# Patient Record
Sex: FEMALE | Race: WHITE | NOT HISPANIC OR LATINO
[De-identification: names, ages, dates, MRNs, and addresses within clinical notes are randomized per-mention and may not be internally consistent; named-entity substitution may affect disease eponyms.]

---

## 2018-02-23 ENCOUNTER — TRANSCRIPTION ENCOUNTER (OUTPATIENT)
Age: 63
End: 2018-02-23

## 2018-12-27 ENCOUNTER — APPOINTMENT (OUTPATIENT)
Dept: RADIOLOGY | Facility: CLINIC | Age: 63
End: 2018-12-27
Payer: COMMERCIAL

## 2018-12-27 ENCOUNTER — OUTPATIENT (OUTPATIENT)
Dept: OUTPATIENT SERVICES | Facility: HOSPITAL | Age: 63
LOS: 1 days | End: 2018-12-27
Payer: COMMERCIAL

## 2018-12-27 DIAGNOSIS — Z98.89 OTHER SPECIFIED POSTPROCEDURAL STATES: Chronic | ICD-10-CM

## 2018-12-27 DIAGNOSIS — I83.93 ASYMPTOMATIC VARICOSE VEINS OF BILATERAL LOWER EXTREMITIES: Chronic | ICD-10-CM

## 2018-12-27 DIAGNOSIS — Z00.8 ENCOUNTER FOR OTHER GENERAL EXAMINATION: ICD-10-CM

## 2018-12-27 PROCEDURE — 73110 X-RAY EXAM OF WRIST: CPT | Mod: 26,50

## 2018-12-27 PROCEDURE — 73130 X-RAY EXAM OF HAND: CPT

## 2018-12-27 PROCEDURE — 73130 X-RAY EXAM OF HAND: CPT | Mod: 26,50

## 2018-12-27 PROCEDURE — 73110 X-RAY EXAM OF WRIST: CPT

## 2019-01-25 ENCOUNTER — TRANSCRIPTION ENCOUNTER (OUTPATIENT)
Age: 64
End: 2019-01-25

## 2019-02-15 ENCOUNTER — TRANSCRIPTION ENCOUNTER (OUTPATIENT)
Age: 64
End: 2019-02-15

## 2020-01-23 ENCOUNTER — TRANSCRIPTION ENCOUNTER (OUTPATIENT)
Age: 65
End: 2020-01-23

## 2021-11-21 ENCOUNTER — TRANSCRIPTION ENCOUNTER (OUTPATIENT)
Age: 66
End: 2021-11-21

## 2021-12-06 ENCOUNTER — TRANSCRIPTION ENCOUNTER (OUTPATIENT)
Age: 66
End: 2021-12-06

## 2021-12-10 ENCOUNTER — TRANSCRIPTION ENCOUNTER (OUTPATIENT)
Age: 66
End: 2021-12-10

## 2022-09-20 ENCOUNTER — NON-APPOINTMENT (OUTPATIENT)
Age: 67
End: 2022-09-20

## 2023-04-26 ENCOUNTER — NON-APPOINTMENT (OUTPATIENT)
Age: 68
End: 2023-04-26

## 2023-07-05 ENCOUNTER — APPOINTMENT (OUTPATIENT)
Dept: RADIOLOGY | Facility: CLINIC | Age: 68
End: 2023-07-05
Payer: COMMERCIAL

## 2023-07-05 PROCEDURE — 77080 DXA BONE DENSITY AXIAL: CPT

## 2023-07-06 ENCOUNTER — TRANSCRIPTION ENCOUNTER (OUTPATIENT)
Age: 68
End: 2023-07-06

## 2023-07-27 ENCOUNTER — OUTPATIENT (OUTPATIENT)
Dept: OUTPATIENT SERVICES | Facility: HOSPITAL | Age: 68
LOS: 1 days | End: 2023-07-27
Payer: COMMERCIAL

## 2023-07-27 VITALS
SYSTOLIC BLOOD PRESSURE: 170 MMHG | HEIGHT: 63 IN | DIASTOLIC BLOOD PRESSURE: 83 MMHG | HEART RATE: 76 BPM | TEMPERATURE: 98 F | OXYGEN SATURATION: 98 % | RESPIRATION RATE: 18 BRPM | WEIGHT: 130.95 LBS

## 2023-07-27 DIAGNOSIS — M21.611 BUNION OF RIGHT FOOT: ICD-10-CM

## 2023-07-27 DIAGNOSIS — I83.93 ASYMPTOMATIC VARICOSE VEINS OF BILATERAL LOWER EXTREMITIES: Chronic | ICD-10-CM

## 2023-07-27 DIAGNOSIS — Z98.890 OTHER SPECIFIED POSTPROCEDURAL STATES: Chronic | ICD-10-CM

## 2023-07-27 DIAGNOSIS — Z01.818 ENCOUNTER FOR OTHER PREPROCEDURAL EXAMINATION: ICD-10-CM

## 2023-07-27 DIAGNOSIS — Z98.89 OTHER SPECIFIED POSTPROCEDURAL STATES: Chronic | ICD-10-CM

## 2023-07-27 PROCEDURE — 85027 COMPLETE CBC AUTOMATED: CPT

## 2023-07-27 PROCEDURE — G0463: CPT

## 2023-07-27 PROCEDURE — 80048 BASIC METABOLIC PNL TOTAL CA: CPT

## 2023-07-27 RX ORDER — SODIUM CHLORIDE 9 MG/ML
1000 INJECTION, SOLUTION INTRAVENOUS
Refills: 0 | Status: DISCONTINUED | OUTPATIENT
Start: 2023-08-16 | End: 2023-08-30

## 2023-07-27 NOTE — H&P PST ADULT - ASSESSMENT
DASI score: 7.0  DASI activity: active at work stairs daily  Loose teeth or denture: Slight loose tooth right lower molar

## 2023-07-27 NOTE — H&P PST ADULT - NSICDXPASTSURGICALHX_GEN_ALL_CORE_FT
PAST SURGICAL HISTORY:  S/P foot surgery, right     Status post bunionectomy left    Varicose vein of leg removal of varicose vein LLE 2012

## 2023-07-27 NOTE — H&P PST ADULT - HISTORY OF PRESENT ILLNESS
68yr old female with right foot bunion and hammertoe. Pt states when she wears shoes the bunion is very painful and the hammertoe rubs inside the shoe. Now coming for right foot lapiplasty. Pt had elevated systolic pressure. Will get med eval. Hx of hypothyroid HLD. Covid hx 12/2022 mild symptoms. 68yr old female with right foot bunion and hammertoe. Pt states when she wears shoes the bunion is very painful and the hammertoe rubs inside the shoe. Now coming for right foot lapifuse. Pt had elevated systolic pressure. Will get med eval. Hx of hypothyroid HLD. Covid hx 12/2022 mild symptoms.

## 2023-07-27 NOTE — H&P PST ADULT - ATTENDING PHYSICIAN: I HAVE REVIEWED THE CLINICAL DOCUMENTATION AND AGREE WITH THE ABOVE NOTE
Left hip pain that has been \"since November after I fell on the stairs.\"  Ambulatory at triage.   Statement Selected

## 2023-07-27 NOTE — H&P PST ADULT - NSICDXPASTMEDICALHX_GEN_ALL_CORE_FT
PAST MEDICAL HISTORY:  Dyslipidemia     History of osteoarthritis     Hypertension     Hypothyroidism     Osteopenia     Psoriasis     Stress fracture Left foot

## 2023-08-15 ENCOUNTER — TRANSCRIPTION ENCOUNTER (OUTPATIENT)
Age: 68
End: 2023-08-15

## 2023-08-16 ENCOUNTER — OUTPATIENT (OUTPATIENT)
Dept: OUTPATIENT SERVICES | Facility: HOSPITAL | Age: 68
LOS: 1 days | End: 2023-08-16
Payer: COMMERCIAL

## 2023-08-16 ENCOUNTER — RESULT REVIEW (OUTPATIENT)
Age: 68
End: 2023-08-16

## 2023-08-16 VITALS
HEART RATE: 65 BPM | SYSTOLIC BLOOD PRESSURE: 152 MMHG | RESPIRATION RATE: 20 BRPM | OXYGEN SATURATION: 96 % | DIASTOLIC BLOOD PRESSURE: 67 MMHG

## 2023-08-16 VITALS
HEART RATE: 62 BPM | WEIGHT: 130.95 LBS | HEIGHT: 63 IN | RESPIRATION RATE: 18 BRPM | OXYGEN SATURATION: 97 % | SYSTOLIC BLOOD PRESSURE: 170 MMHG | TEMPERATURE: 97 F | DIASTOLIC BLOOD PRESSURE: 71 MMHG

## 2023-08-16 DIAGNOSIS — I83.93 ASYMPTOMATIC VARICOSE VEINS OF BILATERAL LOWER EXTREMITIES: Chronic | ICD-10-CM

## 2023-08-16 DIAGNOSIS — Z98.89 OTHER SPECIFIED POSTPROCEDURAL STATES: Chronic | ICD-10-CM

## 2023-08-16 DIAGNOSIS — Z98.890 OTHER SPECIFIED POSTPROCEDURAL STATES: Chronic | ICD-10-CM

## 2023-08-16 DIAGNOSIS — M21.611 BUNION OF RIGHT FOOT: ICD-10-CM

## 2023-08-16 PROCEDURE — 73630 X-RAY EXAM OF FOOT: CPT | Mod: 26,RT

## 2023-08-16 DEVICE — SCREW CANN 4.0X30MM: Type: IMPLANTABLE DEVICE | Site: RIGHT | Status: FUNCTIONAL

## 2023-08-16 DEVICE — IMPLANTABLE DEVICE: Type: IMPLANTABLE DEVICE | Site: RIGHT | Status: FUNCTIONAL

## 2023-08-16 DEVICE — SCREW LOKG 3.5X12MM: Type: IMPLANTABLE DEVICE | Site: RIGHT | Status: FUNCTIONAL

## 2023-08-16 DEVICE — SCREW CORT LO PROF 3.5X22MM: Type: IMPLANTABLE DEVICE | Site: RIGHT | Status: FUNCTIONAL

## 2023-08-16 DEVICE — PIN STEINMAN SMTH 2.5X100MM: Type: IMPLANTABLE DEVICE | Site: RIGHT | Status: FUNCTIONAL

## 2023-08-16 DEVICE — SCREW CORT LO PROF 3.5X14MM: Type: IMPLANTABLE DEVICE | Site: RIGHT | Status: FUNCTIONAL

## 2023-08-16 DEVICE — PIN FIXATION TEMP 1.1MM SM: Type: IMPLANTABLE DEVICE | Site: RIGHT | Status: FUNCTIONAL

## 2023-08-16 DEVICE — GWIRE 1.4X150MM: Type: IMPLANTABLE DEVICE | Site: RIGHT | Status: FUNCTIONAL

## 2023-08-16 DEVICE — GRAFT BONE AUGMENT INJ 3ML SYNTHETIC: Type: IMPLANTABLE DEVICE | Site: RIGHT | Status: FUNCTIONAL

## 2023-08-16 RX ORDER — CEFAZOLIN SODIUM 1 G
2000 VIAL (EA) INJECTION ONCE
Refills: 0 | Status: COMPLETED | OUTPATIENT
Start: 2023-08-16 | End: 2023-08-16

## 2023-08-16 RX ORDER — CHOLECALCIFEROL (VITAMIN D3) 125 MCG
10 CAPSULE ORAL
Refills: 0 | DISCHARGE

## 2023-08-16 RX ORDER — CHLORHEXIDINE GLUCONATE 213 G/1000ML
1 SOLUTION TOPICAL ONCE
Refills: 0 | Status: COMPLETED | OUTPATIENT
Start: 2023-08-16 | End: 2023-08-16

## 2023-08-16 RX ORDER — FENTANYL CITRATE 50 UG/ML
25 INJECTION INTRAVENOUS
Refills: 0 | Status: DISCONTINUED | OUTPATIENT
Start: 2023-08-16 | End: 2023-08-16

## 2023-08-16 RX ORDER — BACILLUS COAGULANS/INULIN 1B-250 MG
1 CAPSULE ORAL
Refills: 0 | DISCHARGE

## 2023-08-16 RX ORDER — OMEGA-3 ACID ETHYL ESTERS 1 G
1 CAPSULE ORAL
Refills: 0 | DISCHARGE

## 2023-08-16 RX ORDER — LIDOCAINE HCL 20 MG/ML
0.2 VIAL (ML) INJECTION ONCE
Refills: 0 | Status: COMPLETED | OUTPATIENT
Start: 2023-08-16 | End: 2023-08-16

## 2023-08-16 RX ORDER — ONDANSETRON 8 MG/1
4 TABLET, FILM COATED ORAL ONCE
Refills: 0 | Status: DISCONTINUED | OUTPATIENT
Start: 2023-08-16 | End: 2023-08-30

## 2023-08-16 RX ADMIN — SODIUM CHLORIDE 100 MILLILITER(S): 9 INJECTION, SOLUTION INTRAVENOUS at 06:16

## 2023-08-16 RX ADMIN — CHLORHEXIDINE GLUCONATE 1 APPLICATION(S): 213 SOLUTION TOPICAL at 06:16

## 2023-08-16 NOTE — ASU DISCHARGE PLAN (ADULT/PEDIATRIC) - ASU DC SPECIAL INSTRUCTIONSFT
You just had R foot surgery. Please keep your dressings clean dry and intact until follow up with Dr. Puente, please do not get your cast or dressings wet. Please do not bear weight on the R foot.

## 2023-08-16 NOTE — ASU PREOP CHECKLIST - LOOSE TEETH
right lower molar slightly loose anesthesia aware/yes right lower molar slightly loose Dr Teixeira aware/yes

## 2023-08-16 NOTE — ASU DISCHARGE PLAN (ADULT/PEDIATRIC) - NURSING INSTRUCTIONS
Next dose of tylenol at/after 04pm____. Do not exceed 4000mg in a 24hour  period. Every 6hours as needed.

## 2023-08-16 NOTE — ASU DISCHARGE PLAN (ADULT/PEDIATRIC) - CARE PROVIDER_API CALL
Deja Puente  Podiatric Medicine and Surgery  [address], [address]  [place] [address]  Phone: [phone]  Fax: [phone]  Established Patient  Follow Up Time: 1 week

## 2023-08-17 PROCEDURE — C1713: CPT

## 2023-08-17 PROCEDURE — C1769: CPT

## 2023-08-17 PROCEDURE — 88300 SURGICAL PATH GROSS: CPT | Mod: 26

## 2023-08-17 PROCEDURE — 28297 COR HLX VLGS JT ARTHRD: CPT | Mod: T5

## 2023-08-17 PROCEDURE — 88300 SURGICAL PATH GROSS: CPT

## 2023-08-17 PROCEDURE — 73630 X-RAY EXAM OF FOOT: CPT

## 2023-08-17 PROCEDURE — 28285 REPAIR OF HAMMERTOE: CPT | Mod: T6

## 2023-08-17 PROCEDURE — C1734: CPT

## 2023-08-31 LAB — SURGICAL PATHOLOGY STUDY: SIGNIFICANT CHANGE UP

## 2023-09-16 ENCOUNTER — NON-APPOINTMENT (OUTPATIENT)
Age: 68
End: 2023-09-16

## 2023-11-19 ENCOUNTER — NON-APPOINTMENT (OUTPATIENT)
Age: 68
End: 2023-11-19

## 2024-12-31 PROBLEM — Z87.39 PERSONAL HISTORY OF OTHER DISEASES OF THE MUSCULOSKELETAL SYSTEM AND CONNECTIVE TISSUE: Chronic | Status: ACTIVE | Noted: 2023-07-27

## 2025-01-07 ENCOUNTER — APPOINTMENT (OUTPATIENT)
Dept: ORTHOPEDIC SURGERY | Facility: CLINIC | Age: 70
End: 2025-01-07

## 2025-05-10 ENCOUNTER — INPATIENT (INPATIENT)
Facility: HOSPITAL | Age: 70
LOS: 0 days | Discharge: HOME | DRG: 596 | End: 2025-05-11
Attending: HOSPITALIST | Admitting: HOSPITALIST
Payer: MEDICARE

## 2025-05-10 VITALS
WEIGHT: 162.04 LBS | OXYGEN SATURATION: 100 % | TEMPERATURE: 98 F | HEART RATE: 98 BPM | DIASTOLIC BLOOD PRESSURE: 91 MMHG | RESPIRATION RATE: 18 BRPM | SYSTOLIC BLOOD PRESSURE: 173 MMHG | HEIGHT: 61 IN

## 2025-05-10 DIAGNOSIS — Z98.890 OTHER SPECIFIED POSTPROCEDURAL STATES: Chronic | ICD-10-CM

## 2025-05-10 DIAGNOSIS — Z98.89 OTHER SPECIFIED POSTPROCEDURAL STATES: Chronic | ICD-10-CM

## 2025-05-10 DIAGNOSIS — I83.93 ASYMPTOMATIC VARICOSE VEINS OF BILATERAL LOWER EXTREMITIES: Chronic | ICD-10-CM

## 2025-05-10 LAB
ALBUMIN SERPL ELPH-MCNC: 3.7 G/DL — SIGNIFICANT CHANGE UP (ref 3.3–5)
ALP SERPL-CCNC: 87 U/L — SIGNIFICANT CHANGE UP (ref 40–120)
ALT FLD-CCNC: 66 U/L — HIGH (ref 10–45)
ANION GAP SERPL CALC-SCNC: 16 MMOL/L — SIGNIFICANT CHANGE UP (ref 5–17)
AST SERPL-CCNC: 95 U/L — HIGH (ref 10–40)
BASOPHILS # BLD AUTO: 0.06 K/UL — SIGNIFICANT CHANGE UP (ref 0–0.2)
BASOPHILS NFR BLD AUTO: 0.8 % — SIGNIFICANT CHANGE UP (ref 0–2)
BILIRUB SERPL-MCNC: 0.7 MG/DL — SIGNIFICANT CHANGE UP (ref 0.2–1.2)
BUN SERPL-MCNC: 15 MG/DL — SIGNIFICANT CHANGE UP (ref 7–23)
CALCIUM SERPL-MCNC: 9 MG/DL — SIGNIFICANT CHANGE UP (ref 8.4–10.5)
CHLORIDE SERPL-SCNC: 100 MMOL/L — SIGNIFICANT CHANGE UP (ref 96–108)
CO2 SERPL-SCNC: 18 MMOL/L — LOW (ref 22–31)
CREAT SERPL-MCNC: 0.86 MG/DL — SIGNIFICANT CHANGE UP (ref 0.5–1.3)
EGFR: 72 ML/MIN/1.73M2 — SIGNIFICANT CHANGE UP
EGFR: 72 ML/MIN/1.73M2 — SIGNIFICANT CHANGE UP
EOSINOPHIL # BLD AUTO: 0.16 K/UL — SIGNIFICANT CHANGE UP (ref 0–0.5)
EOSINOPHIL NFR BLD AUTO: 2.2 % — SIGNIFICANT CHANGE UP (ref 0–6)
FLUAV AG NPH QL: SIGNIFICANT CHANGE UP
FLUBV AG NPH QL: SIGNIFICANT CHANGE UP
GLUCOSE SERPL-MCNC: 89 MG/DL — SIGNIFICANT CHANGE UP (ref 70–99)
HCT VFR BLD CALC: 42.6 % — SIGNIFICANT CHANGE UP (ref 34.5–45)
HGB BLD-MCNC: 14.2 G/DL — SIGNIFICANT CHANGE UP (ref 11.5–15.5)
IMM GRANULOCYTES NFR BLD AUTO: 0.3 % — SIGNIFICANT CHANGE UP (ref 0–0.9)
LACTATE BLDV-MCNC: 1.6 MMOL/L — SIGNIFICANT CHANGE UP (ref 0.5–2)
LIDOCAIN IGE QN: 221 U/L — HIGH (ref 7–60)
LYMPHOCYTES # BLD AUTO: 0.86 K/UL — LOW (ref 1–3.3)
LYMPHOCYTES # BLD AUTO: 11.6 % — LOW (ref 13–44)
MCHC RBC-ENTMCNC: 33.3 G/DL — SIGNIFICANT CHANGE UP (ref 32–36)
MCHC RBC-ENTMCNC: 34.9 PG — HIGH (ref 27–34)
MCV RBC AUTO: 104.7 FL — HIGH (ref 80–100)
MONOCYTES # BLD AUTO: 1 K/UL — HIGH (ref 0–0.9)
MONOCYTES NFR BLD AUTO: 13.5 % — SIGNIFICANT CHANGE UP (ref 2–14)
NEUTROPHILS # BLD AUTO: 5.31 K/UL — SIGNIFICANT CHANGE UP (ref 1.8–7.4)
NEUTROPHILS NFR BLD AUTO: 71.6 % — SIGNIFICANT CHANGE UP (ref 43–77)
NRBC BLD AUTO-RTO: 0 /100 WBCS — SIGNIFICANT CHANGE UP (ref 0–0)
PLATELET # BLD AUTO: 323 K/UL — SIGNIFICANT CHANGE UP (ref 150–400)
POTASSIUM SERPL-MCNC: 3.4 MMOL/L — LOW (ref 3.5–5.3)
POTASSIUM SERPL-SCNC: 3.4 MMOL/L — LOW (ref 3.5–5.3)
PROT SERPL-MCNC: 8.8 G/DL — HIGH (ref 6–8.3)
RBC # BLD: 4.07 M/UL — SIGNIFICANT CHANGE UP (ref 3.8–5.2)
RBC # FLD: 12.6 % — SIGNIFICANT CHANGE UP (ref 10.3–14.5)
RSV RNA NPH QL NAA+NON-PROBE: SIGNIFICANT CHANGE UP
SARS-COV-2 RNA SPEC QL NAA+PROBE: SIGNIFICANT CHANGE UP
SODIUM SERPL-SCNC: 134 MMOL/L — LOW (ref 135–145)
SOURCE RESPIRATORY: SIGNIFICANT CHANGE UP
WBC # BLD: 7.41 K/UL — SIGNIFICANT CHANGE UP (ref 3.8–10.5)
WBC # FLD AUTO: 7.41 K/UL — SIGNIFICANT CHANGE UP (ref 3.8–10.5)

## 2025-05-10 PROCEDURE — 71045 X-RAY EXAM CHEST 1 VIEW: CPT | Mod: 26

## 2025-05-10 PROCEDURE — 93010 ELECTROCARDIOGRAM REPORT: CPT

## 2025-05-10 PROCEDURE — 99285 EMERGENCY DEPT VISIT HI MDM: CPT | Mod: GC

## 2025-05-10 RX ORDER — ONDANSETRON HCL/PF 4 MG/2 ML
4 VIAL (ML) INJECTION ONCE
Refills: 0 | Status: COMPLETED | OUTPATIENT
Start: 2025-05-10 | End: 2025-05-10

## 2025-05-10 RX ORDER — DIPHENHYDRAMINE HCL 12.5MG/5ML
25 ELIXIR ORAL ONCE
Refills: 0 | Status: COMPLETED | OUTPATIENT
Start: 2025-05-10 | End: 2025-05-10

## 2025-05-10 RX ADMIN — Medication 20 MILLIGRAM(S): at 22:04

## 2025-05-10 RX ADMIN — Medication 25 MILLIGRAM(S): at 22:05

## 2025-05-10 RX ADMIN — Medication 1000 MILLILITER(S): at 22:06

## 2025-05-10 RX ADMIN — Medication 4 MILLIGRAM(S): at 22:05

## 2025-05-10 NOTE — ED PROVIDER NOTE - NSICDXPASTMEDICALHX_GEN_ALL_CORE_FT
PAST MEDICAL HISTORY:  Dyslipidemia     History of osteoarthritis     Hypertension     Hypothyroidism     Osteopenia     Psoriasis     Psoriasis     Stress fracture Left foot

## 2025-05-10 NOTE — ED PROVIDER NOTE - OBJECTIVE STATEMENT
70-year-old female with a history of hypertension, hyperlipidemia, hypothyroidism, psoriasis,, presented to emergency room with nausea for 1 month unable to eat anything  feeling dehydrated ,  has itching and increased rash all over the body   just give me a second complaint right with initial doctor she was seen today by dermatology Dr. Kassie Del Valle, who states that her psoriasis is exacerbation and want her to be admitted to the hospital for IV hydration and restart biological medications    patient states that she is itching all the time the rash is increased , she feels nausea and unable to even look at look at look  at the food    dermatologist dr Jose Alvarenga [phone]

## 2025-05-10 NOTE — ED PROVIDER NOTE - CONSTITUTIONAL, MLM
chronically ill awake, alert, oriented to person, place, time/situation and in no apparent distress. normal...

## 2025-05-10 NOTE — ED ADULT NURSE NOTE - OBJECTIVE STATEMENT
70y F A7o x3 coming in from home, ambulatory and  bedside. Presenting to the ER with psoriasis flare up which has progressively been worsening over th e past month. Pt has hx of psoriasis has been well controlled. Psoriasis has progressing throughout generalized body; per pt the worst areas are on her back, inner thighs b/l and genitals. Pt tried to managed with benadryl during this time but has had no relief.  In this pat week pt has also been having intermittent episodes of nausea and emesis (nonbloody) and as well affect her ability to sleep and appetite. Overall pt has been more itchy. Pt went to dermatologist today who recommended ER evaluation. Denies c/p, sob, HA, fevers, chills, cough, numbness, tingling.

## 2025-05-10 NOTE — ED ADULT NURSE NOTE - PRO INTERPRETER NEED 2
Health Maintenance, Male  A healthy lifestyle and preventive care is important for your health and wellness. Ask your health care provider about what schedule of regular examinations is right for you.  What should I know about weight and diet?    Eat a Healthy Diet  Eat plenty of vegetables, fruits, whole grains, low-fat dairy products, and lean protein.  Do not eat a lot of foods high in solid fats, added sugars, or salt.     Maintain a Healthy Weight  Regular exercise can help you achieve or maintain a healthy weight. You should:  Do at least 150 minutes of exercise each week. The exercise should increase your heart rate and make you sweat (moderate-intensity exercise).  Do strength-training exercises at least twice a week.     Watch Your Levels of Cholesterol and Blood Lipids  Have your blood tested for lipids and cholesterol every 5 years starting at 35 years of age. If you are at high risk for heart disease, you should start having your blood tested when you are 20 years old. You may need to have your cholesterol levels checked more often if:  Your lipid or cholesterol levels are high.  You are older than 50 years of age.  You are at high risk for heart disease.     What should I know about cancer screening?  Many types of cancers can be detected early and may often be prevented.  Lung Cancer  You should be screened every year for lung cancer if:  You are a current smoker who has smoked for at least 30 years.  You are a former smoker who has quit within the past 15 years.  Talk to your health care provider about your screening options, when you should start screening, and how often you should be screened.     Colorectal Cancer  Routine colorectal cancer screening usually begins at 50 years of age and should be repeated every 5-10 years until you are 75 years old. You may need to be screened more often if early forms of precancerous polyps or small growths are found. Your health care provider may recommend  screening at an earlier age if you have risk factors for colon cancer.  Your health care provider may recommend using home test kits to check for hidden blood in the stool.  A small camera at the end of a tube can be used to examine your colon (sigmoidoscopy or colonoscopy). This checks for the earliest forms of colorectal cancer.     Prostate and Testicular Cancer  Depending on your age and overall health, your health care provider may do certain tests to screen for prostate and testicular cancer.  Talk to your health care provider about any symptoms or concerns you have about testicular or prostate cancer.     Skin Cancer  Check your skin from head to toe regularly.  Tell your health care provider about any new moles or changes in moles, especially if:  There is a change in a mole’s size, shape, or color.  You have a mole that is larger than a pencil eraser.  Always use sunscreen. Apply sunscreen liberally and repeat throughout the day.  Protect yourself by wearing long sleeves, pants, a wide-brimmed hat, and sunglasses when outside.     What should I know about heart disease, diabetes, and high blood pressure?  If you are 18-39 years of age, have your blood pressure checked every 3-5 years. If you are 40 years of age or older, have your blood pressure checked every year. You should have your blood pressure measured twice--once when you are at a hospital or clinic, and once when you are not at a hospital or clinic. Record the average of the two measurements. To check your blood pressure when you are not at a hospital or clinic, you can use:  An automated blood pressure machine at a pharmacy.  A home blood pressure monitor.  Talk to your health care provider about your target blood pressure.  If you are between 45-79 years old, ask your health care provider if you should take aspirin to prevent heart disease.  Have regular diabetes screenings by checking your fasting blood sugar level.  If you are at a normal  weight and have a low risk for diabetes, have this test once every three years after the age of 45.  If you are overweight and have a high risk for diabetes, consider being tested at a younger age or more often.  A one-time screening for abdominal aortic aneurysm (AAA) by ultrasound is recommended for men aged 65-75 years who are current or former smokers.  What should I know about preventing infection?  Hepatitis B  If you have a higher risk for hepatitis B, you should be screened for this virus. Talk with your health care provider to find out if you are at risk for hepatitis B infection.  Hepatitis C  Blood testing is recommended for:  Everyone born from 1945 through 1965.  Anyone with known risk factors for hepatitis C.     Sexually Transmitted Diseases (STDs)  You should be screened each year for STDs including gonorrhea and chlamydia if:  You are sexually active and are younger than 24 years of age.  You are older than 24 years of age and your health care provider tells you that you are at risk for this type of infection.  Your sexual activity has changed since you were last screened and you are at an increased risk for chlamydia or gonorrhea. Ask your health care provider if you are at risk.  Talk with your health care provider about whether you are at high risk of being infected with HIV. Your health care provider may recommend a prescription medicine to help prevent HIV infection.     What else can I do?    Schedule regular health, dental, and eye exams.  Stay current with your vaccines (immunizations).  Do not use any tobacco products, such as cigarettes, chewing tobacco, and e-cigarettes. If you need help quitting, ask your health care provider.  Limit alcohol intake to no more than 2 drinks per day. One drink equals 12 ounces of beer, 5 ounces of wine, or 1½ ounces of hard liquor.  Do not use street drugs.  Do not share needles.  Ask your health care provider for help if you need support or information  about quitting drugs.  Tell your health care provider if you often feel depressed.  Tell your health care provider if you have ever been abused or do not feel safe at home.      This information is not intended to replace advice given to you by your health care provider. Make sure you discuss any questions you have with your health care provider.  Document Released: 06/15/2009 Document Revised: 08/16/2017 Document Reviewed: 09/20/2016  [employer] Patient Education © 2018 [employer]    Healthy Eating  Following a healthy eating pattern may help you to achieve and maintain a healthy body weight, reduce the risk of chronic disease, and live a long and productive life. It is important to follow a healthy eating pattern at an appropriate calorie level for your body. Your nutritional needs should be met primarily through food by choosing a variety of nutrient-rich foods.  What are tips for following this plan?  Reading food labels  Read labels and choose the following:  Reduced or low sodium.  Juices with 100% fruit juice.  Foods with low saturated fats and high polyunsaturated and monounsaturated fats.  Foods with whole grains, such as whole wheat, cracked wheat, brown rice, and wild rice.  Whole grains that are fortified with folic acid. This is recommended for women who are pregnant or who want to become pregnant.  Read labels and avoid the following:  Foods with a lot of added sugars. These include foods that contain brown sugar, corn sweetener, corn syrup, dextrose, fructose, glucose, high-fructose corn syrup, honey, invert sugar, lactose, malt syrup, maltose, molasses, raw sugar, sucrose, trehalose, or turbinado sugar.  Do not eat more than the following amounts of added sugar per day:  6 teaspoons (25 g) for women.  9 teaspoons (38 g) for men.  Foods that contain processed or refined starches and grains.  Refined grain products, such as white flour, degermed cornmeal, white bread, and white  "rice.  Shopping  Choose nutrient-rich snacks, such as vegetables, whole fruits, and nuts. Avoid high-calorie and high-sugar snacks, such as potato chips, fruit snacks, and candy.  Use oil-based dressings and spreads on foods instead of solid fats such as butter, stick margarine, or cream cheese.  Limit pre-made sauces, mixes, and \"instant\" products such as flavored rice, instant noodles, and ready-made pasta.  Try more plant-protein sources, such as tofu, tempeh, black beans, edamame, lentils, nuts, and seeds.  Explore eating plans such as the Mediterranean diet or vegetarian diet.  Cooking  Use oil to sauté or stir-zapata foods instead of solid fats such as butter, stick margarine, or lard.  Try baking, boiling, grilling, or broiling instead of frying.  Remove the fatty part of meats before cooking.  Steam vegetables in water or broth.  Meal planning    At meals, imagine dividing your plate into fourths:  One-half of your plate is fruits and vegetables.  One-fourth of your plate is whole grains.  One-fourth of your plate is protein, especially lean meats, poultry, eggs, tofu, beans, or nuts.  Include low-fat dairy as part of your daily diet.     Lifestyle  Choose healthy options in all settings, including home, work, school, restaurants, or stores.  Prepare your food safely:  Wash your hands after handling raw meats.  Keep food preparation surfaces clean by regularly washing with hot, soapy water.  Keep raw meats separate from ready-to-eat foods, such as fruits and vegetables.  , meat, poultry, and eggs to the recommended internal temperature.  Store foods at safe temperatures. In general:  Keep cold foods at 40°F (4.4°C) or below.  Keep hot foods at 140°F (60°C) or above.  Keep your freezer at 0°F (-17.8°C) or below.  Foods are no longer safe to eat when they have been between the temperatures of 40°-140°F (4.4-60°C) for more than 2 hours.  What foods should I eat?  Fruits  Aim to eat 2 cup-equivalents of " fresh, canned (in natural juice), or frozen fruits each day. Examples of 1 cup-equivalent of fruit include 1 small apple, 8 large strawberries, 1 cup canned fruit, ½ cup dried fruit, or 1 cup 100% juice.  Vegetables  Aim to eat 2½-3 cup-equivalents of fresh and frozen vegetables each day, including different varieties and colors. Examples of 1 cup-equivalent of vegetables include 2 medium carrots, 2 cups raw, leafy greens, 1 cup chopped vegetable (raw or cooked), or 1 medium baked potato.  Grains  Aim to eat 6 ounce-equivalents of whole grains each day. Examples of 1 ounce-equivalent of grains include 1 slice of bread, 1 cup ready-to-eat cereal, 3 cups popcorn, or ½ cup cooked rice, pasta, or cereal.  Meats and other proteins  Aim to eat 5-6 ounce-equivalents of protein each day. Examples of 1 ounce-equivalent of protein include 1 egg, 1/2 cup nuts or seeds, or 1 tablespoon (16 g) peanut butter. A cut of meat or fish that is the size of a deck of cards is about 3-4 ounce-equivalents.  Of the protein you eat each week, try to have at least 8 ounces come from seafood. This includes salmon, trout, herring, and anchovies.  Dairy  Aim to eat 3 cup-equivalents of fat-free or low-fat dairy each day. Examples of 1 cup-equivalent of dairy include 1 cup (240 mL) milk, 8 ounces (250 g) yogurt, 1½ ounces (44 g) natural cheese, or 1 cup (240 mL) fortified soy milk.  Fats and oils  Aim for about 5 teaspoons (21 g) per day. Choose monounsaturated fats, such as canola and olive oils, avocados, peanut butter, and most nuts, or polyunsaturated fats, such as sunflower, corn, and soybean oils, walnuts, pine nuts, sesame seeds, sunflower seeds, and flaxseed.  Beverages  Aim for six 8-oz glasses of water per day. Limit coffee to three to five 8-oz cups per day.  Limit caffeinated beverages that have added calories, such as soda and energy drinks.  Limit alcohol intake to no more than 1 drink a day for nonpregnant women and 2 drinks a day  for men. One drink equals 12 oz of beer (355 mL), 5 oz of wine (148 mL), or 1½ oz of hard liquor (44 mL).  Seasoning and other foods  Avoid adding excess amounts of salt to your foods. Try flavoring foods with herbs and spices instead of salt.  Avoid adding sugar to foods.  Try using oil-based dressings, sauces, and spreads instead of solid fats.  This information is based on general [place] nutrition guidelines. For more information, visit choosemyplate.gov. Exact amounts may vary based on your nutrition needs.  Summary  A healthy eating plan may help you to maintain a healthy weight, reduce the risk of chronic diseases, and stay active throughout your life.  Plan your meals. Make sure you eat the right portions of a variety of nutrient-rich foods.  Try baking, boiling, grilling, or broiling instead of frying.  Choose healthy options in all settings, including home, work, school, restaurants, or stores.  This information is not intended to replace advice given to you by your health care provider. Make sure you discuss any questions you have with your health care provider.  Document Revised: 04/01/2019 Document Reviewed: 04/01/2019  [employer] Patient Education © 2021 [employer]    Exercising to Stay Healthy  To become healthy and stay healthy, it is recommended that you do moderate-intensity and vigorous-intensity exercise. You can tell that you are exercising at a moderate intensity if your heart starts beating faster and you start breathing faster but can still hold a conversation. You can tell that you are exercising at a vigorous intensity if you are breathing much harder and faster and cannot hold a conversation while exercising.  Exercising regularly is important. It has many health benefits, such as:  Improving overall fitness, flexibility, and endurance.  Increasing bone density.  Helping with weight control.  Decreasing body fat.  Increasing muscle strength.  Reducing stress and tension.  Improving overall  health.  How often should I exercise?  Choose an activity that you enjoy, and set realistic goals. Your health care provider can help you make an activity plan that works for you.  Exercise regularly as told by your health care provider. This may include:  Doing strength training two times a week, such as:  Lifting weights.  Using resistance bands.  Push-ups.  Sit-ups.  Yoga.  Doing a certain intensity of exercise for a given amount of time. Choose from these options:  A total of 150 minutes of moderate-intensity exercise every week.  A total of 75 minutes of vigorous-intensity exercise every week.  A mix of moderate-intensity and vigorous-intensity exercise every week.  Children, pregnant women, people who have not exercised regularly, people who are overweight, and older adults may need to talk with a health care provider about what activities are safe to do. If you have a medical condition, be sure to talk with your health care provider before you start a new exercise program.  What are some exercise ideas?    Moderate-intensity exercise ideas include:  Walking 1 mile (1.6 km) in about 15 minutes.  Biking.  Hiking.  Golfing.  Dancing.  Water aerobics.  Vigorous-intensity exercise ideas include:  Walking 4.5 miles (7.2 km) or more in about 1 hour.  Jogging or running 5 miles (8 km) in about 1 hour.  Biking 10 miles (16.1 km) or more in about 1 hour.  Lap swimming.  Roller-skating or in-line skating.  Cross-country skiing.  Vigorous competitive sports, such as football, basketball, and soccer.  Jumping rope.  Aerobic dancing.  What are some everyday activities that can help me to get exercise?  Yard work, such as:  Pushing a .  Raking and bagging leaves.  Washing your car.  Pushing a stroller.  Shoveling snow.  Gardening.  Washing windows or floors.  How can I be more active in my day-to-day activities?  Use stairs instead of an elevator.  Take a walk during your lunch break.  If you drive, park your car  farther away from your work or school.  If you take public transportation, get off one stop early and walk the rest of the way.  Stand up or walk around during all of your indoor phone calls.  Get up, stretch, and walk around every 30 minutes throughout the day.  Enjoy exercise with a friend. Support to continue exercising will help you keep a regular routine of activity.  What guidelines can I follow while exercising?  Before you start a new exercise program, talk with your health care provider.  Do not exercise so much that you hurt yourself, feel dizzy, or get very short of breath.  Wear comfortable clothes and wear shoes with good support.  Drink plenty of water while you exercise to prevent dehydration or heat stroke.  Work out until your breathing and your heartbeat get faster.  Where to find more information  [place] Department of Health and Human Services: www.hhs.gov  Centers for Disease Control and Prevention (CDC): www.cdc.gov  Summary  Exercising regularly is important. It will improve your overall fitness, flexibility, and endurance.  Regular exercise also will improve your overall health. It can help you control your weight, reduce stress, and improve your bone density.  Do not exercise so much that you hurt yourself, feel dizzy, or get very short of breath.  Before you start a new exercise program, talk with your health care provider.  This information is not intended to replace advice given to you by your health care provider. Make sure you discuss any questions you have with your health care provider.  Document Revised: 11/30/2018 Document Reviewed: 11/08/2018  [employer] Patient Education © 2021 [employer]       English

## 2025-05-10 NOTE — ED PROVIDER NOTE - CLINICAL SUMMARY MEDICAL DECISION MAKING FREE TEXT BOX
70-year-old long history of psoriasis rheumatoid arthritis used to be on Stelara,  when she developed rheumatoid arthritis the medication was discontinued and she restarted   on other biological agent   did not have the treatment for 3 years  seen today by dermatology who was concerned about hip psoriasis is acting up has multiple lesions all over the body significant itching nausea, unable to eat and she referred patient to emergency room for admission to restart new biological agent , no fever feels chills , no diarrhea no abdominal pain just feel very nausea for 1 month and unable to keep anything down    will obtain blood work IV hydration dermatology consult and admission to the hospital [facility]

## 2025-05-11 VITALS
RESPIRATION RATE: 18 BRPM | HEART RATE: 80 BPM | SYSTOLIC BLOOD PRESSURE: 151 MMHG | WEIGHT: 134.04 LBS | TEMPERATURE: 98 F | DIASTOLIC BLOOD PRESSURE: 76 MMHG | OXYGEN SATURATION: 98 % | HEIGHT: 63 IN

## 2025-05-11 DIAGNOSIS — I10 ESSENTIAL (PRIMARY) HYPERTENSION: ICD-10-CM

## 2025-05-11 DIAGNOSIS — Z29.9 ENCOUNTER FOR PROPHYLACTIC MEASURES, UNSPECIFIED: ICD-10-CM

## 2025-05-11 DIAGNOSIS — L40.9 PSORIASIS, UNSPECIFIED: ICD-10-CM

## 2025-05-11 DIAGNOSIS — R11.2 NAUSEA WITH VOMITING, UNSPECIFIED: ICD-10-CM

## 2025-05-11 DIAGNOSIS — M81.0 AGE-RELATED OSTEOPOROSIS WITHOUT CURRENT PATHOLOGICAL FRACTURE: ICD-10-CM

## 2025-05-11 DIAGNOSIS — R74.01 ELEVATION OF LEVELS OF LIVER TRANSAMINASE LEVELS: ICD-10-CM

## 2025-05-11 DIAGNOSIS — R21 RASH AND OTHER NONSPECIFIC SKIN ERUPTION: ICD-10-CM

## 2025-05-11 DIAGNOSIS — E78.5 HYPERLIPIDEMIA, UNSPECIFIED: ICD-10-CM

## 2025-05-11 DIAGNOSIS — E03.9 HYPOTHYROIDISM, UNSPECIFIED: ICD-10-CM

## 2025-05-11 LAB
ALBUMIN SERPL ELPH-MCNC: 3.1 G/DL — LOW (ref 3.3–5)
ALBUMIN SERPL ELPH-MCNC: 3.3 G/DL — SIGNIFICANT CHANGE UP (ref 3.3–5)
ALP SERPL-CCNC: 76 U/L — SIGNIFICANT CHANGE UP (ref 40–120)
ALP SERPL-CCNC: 85 U/L — SIGNIFICANT CHANGE UP (ref 40–120)
ALT FLD-CCNC: 53 U/L — HIGH (ref 10–45)
ALT FLD-CCNC: 58 U/L — HIGH (ref 10–45)
ANION GAP SERPL CALC-SCNC: 11 MMOL/L — SIGNIFICANT CHANGE UP (ref 5–17)
ANION GAP SERPL CALC-SCNC: 14 MMOL/L — SIGNIFICANT CHANGE UP (ref 5–17)
AST SERPL-CCNC: 69 U/L — HIGH (ref 10–40)
AST SERPL-CCNC: 69 U/L — HIGH (ref 10–40)
BASOPHILS # BLD AUTO: 0.06 K/UL — SIGNIFICANT CHANGE UP (ref 0–0.2)
BASOPHILS NFR BLD AUTO: 1 % — SIGNIFICANT CHANGE UP (ref 0–2)
BILIRUB SERPL-MCNC: 0.6 MG/DL — SIGNIFICANT CHANGE UP (ref 0.2–1.2)
BILIRUB SERPL-MCNC: 0.7 MG/DL — SIGNIFICANT CHANGE UP (ref 0.2–1.2)
BUN SERPL-MCNC: 12 MG/DL — SIGNIFICANT CHANGE UP (ref 7–23)
BUN SERPL-MCNC: 13 MG/DL — SIGNIFICANT CHANGE UP (ref 7–23)
CALCIUM SERPL-MCNC: 8.2 MG/DL — LOW (ref 8.4–10.5)
CALCIUM SERPL-MCNC: 9 MG/DL — SIGNIFICANT CHANGE UP (ref 8.4–10.5)
CHLORIDE SERPL-SCNC: 104 MMOL/L — SIGNIFICANT CHANGE UP (ref 96–108)
CHLORIDE SERPL-SCNC: 106 MMOL/L — SIGNIFICANT CHANGE UP (ref 96–108)
CHOLEST SERPL-MCNC: 172 MG/DL — SIGNIFICANT CHANGE UP
CO2 SERPL-SCNC: 18 MMOL/L — LOW (ref 22–31)
CO2 SERPL-SCNC: 20 MMOL/L — LOW (ref 22–31)
CREAT SERPL-MCNC: 0.71 MG/DL — SIGNIFICANT CHANGE UP (ref 0.5–1.3)
CREAT SERPL-MCNC: 0.71 MG/DL — SIGNIFICANT CHANGE UP (ref 0.5–1.3)
EGFR: 91 ML/MIN/1.73M2 — SIGNIFICANT CHANGE UP
EOSINOPHIL # BLD AUTO: 0.36 K/UL — SIGNIFICANT CHANGE UP (ref 0–0.5)
EOSINOPHIL NFR BLD AUTO: 5.8 % — SIGNIFICANT CHANGE UP (ref 0–6)
FOLATE SERPL-MCNC: 7.5 NG/ML — SIGNIFICANT CHANGE UP
GLUCOSE SERPL-MCNC: 141 MG/DL — HIGH (ref 70–99)
GLUCOSE SERPL-MCNC: 87 MG/DL — SIGNIFICANT CHANGE UP (ref 70–99)
HCT VFR BLD CALC: 43.2 % — SIGNIFICANT CHANGE UP (ref 34.5–45)
HDLC SERPL-MCNC: 60 MG/DL — SIGNIFICANT CHANGE UP
HGB BLD-MCNC: 13.9 G/DL — SIGNIFICANT CHANGE UP (ref 11.5–15.5)
IMM GRANULOCYTES NFR BLD AUTO: 0.3 % — SIGNIFICANT CHANGE UP (ref 0–0.9)
LDLC SERPL-MCNC: 94 MG/DL — SIGNIFICANT CHANGE UP
LIPID PNL WITH DIRECT LDL SERPL: 94 MG/DL — SIGNIFICANT CHANGE UP
LYMPHOCYTES # BLD AUTO: 0.66 K/UL — LOW (ref 1–3.3)
LYMPHOCYTES # BLD AUTO: 10.6 % — LOW (ref 13–44)
MAGNESIUM SERPL-MCNC: 1.9 MG/DL — SIGNIFICANT CHANGE UP (ref 1.6–2.6)
MCHC RBC-ENTMCNC: 32.2 G/DL — SIGNIFICANT CHANGE UP (ref 32–36)
MCHC RBC-ENTMCNC: 33.9 PG — SIGNIFICANT CHANGE UP (ref 27–34)
MCV RBC AUTO: 105.4 FL — HIGH (ref 80–100)
MONOCYTES # BLD AUTO: 0.68 K/UL — SIGNIFICANT CHANGE UP (ref 0–0.9)
MONOCYTES NFR BLD AUTO: 10.9 % — SIGNIFICANT CHANGE UP (ref 2–14)
NEUTROPHILS # BLD AUTO: 4.46 K/UL — SIGNIFICANT CHANGE UP (ref 1.8–7.4)
NEUTROPHILS NFR BLD AUTO: 71.4 % — SIGNIFICANT CHANGE UP (ref 43–77)
NONHDLC SERPL-MCNC: 113 MG/DL — SIGNIFICANT CHANGE UP
NRBC BLD AUTO-RTO: 0 /100 WBCS — SIGNIFICANT CHANGE UP (ref 0–0)
PHOSPHATE SERPL-MCNC: 2.4 MG/DL — LOW (ref 2.5–4.5)
PLATELET # BLD AUTO: 195 K/UL — SIGNIFICANT CHANGE UP (ref 150–400)
POTASSIUM SERPL-MCNC: 3.8 MMOL/L — SIGNIFICANT CHANGE UP (ref 3.5–5.3)
POTASSIUM SERPL-MCNC: 3.8 MMOL/L — SIGNIFICANT CHANGE UP (ref 3.5–5.3)
POTASSIUM SERPL-SCNC: 3.8 MMOL/L — SIGNIFICANT CHANGE UP (ref 3.5–5.3)
POTASSIUM SERPL-SCNC: 3.8 MMOL/L — SIGNIFICANT CHANGE UP (ref 3.5–5.3)
PROT SERPL-MCNC: 7.6 G/DL — SIGNIFICANT CHANGE UP (ref 6–8.3)
PROT SERPL-MCNC: 8.2 G/DL — SIGNIFICANT CHANGE UP (ref 6–8.3)
RBC # BLD: 4.1 M/UL — SIGNIFICANT CHANGE UP (ref 3.8–5.2)
RBC # FLD: 12.6 % — SIGNIFICANT CHANGE UP (ref 10.3–14.5)
SODIUM SERPL-SCNC: 136 MMOL/L — SIGNIFICANT CHANGE UP (ref 135–145)
SODIUM SERPL-SCNC: 137 MMOL/L — SIGNIFICANT CHANGE UP (ref 135–145)
TRIGL SERPL-MCNC: 101 MG/DL — SIGNIFICANT CHANGE UP
VIT B12 SERPL-MCNC: 573 PG/ML — SIGNIFICANT CHANGE UP (ref 232–1245)
WBC # BLD: 6.24 K/UL — SIGNIFICANT CHANGE UP (ref 3.8–10.5)
WBC # FLD AUTO: 6.24 K/UL — SIGNIFICANT CHANGE UP (ref 3.8–10.5)

## 2025-05-11 PROCEDURE — 88189 FLOWCYTOMETRY/READ 16 & >: CPT | Mod: 59

## 2025-05-11 PROCEDURE — 99222 1ST HOSP IP/OBS MODERATE 55: CPT

## 2025-05-11 PROCEDURE — 76705 ECHO EXAM OF ABDOMEN: CPT | Mod: 26

## 2025-05-11 PROCEDURE — 99223 1ST HOSP IP/OBS HIGH 75: CPT | Mod: GC,AI

## 2025-05-11 RX ORDER — LACTOBACILLUS ACIDOPHILUS/PECT 75 MM-100
1 CAPSULE ORAL DAILY
Refills: 0 | Status: DISCONTINUED | OUTPATIENT
Start: 2025-05-11 | End: 2025-05-11

## 2025-05-11 RX ORDER — SODIUM CHLORIDE 9 G/1000ML
1000 INJECTION, SOLUTION INTRAVENOUS
Refills: 0 | Status: DISCONTINUED | OUTPATIENT
Start: 2025-05-11 | End: 2025-05-11

## 2025-05-11 RX ORDER — LEVOTHYROXINE SODIUM 300 MCG
125 TABLET ORAL DAILY
Refills: 0 | Status: DISCONTINUED | OUTPATIENT
Start: 2025-05-11 | End: 2025-05-11

## 2025-05-11 RX ORDER — B1/B2/B3/B5/B6/B12/VIT C/FOLIC 500-0.5 MG
1 TABLET ORAL DAILY
Refills: 0 | Status: DISCONTINUED | OUTPATIENT
Start: 2025-05-11 | End: 2025-05-11

## 2025-05-11 RX ORDER — SOD PHOS DI, MONO/K PHOS MONO 250 MG
1 TABLET ORAL ONCE
Refills: 0 | Status: COMPLETED | OUTPATIENT
Start: 2025-05-11 | End: 2025-05-11

## 2025-05-11 RX ADMIN — SODIUM CHLORIDE 75 MILLILITER(S): 9 INJECTION, SOLUTION INTRAVENOUS at 05:25

## 2025-05-11 RX ADMIN — Medication 40 MILLIEQUIVALENT(S): at 05:26

## 2025-05-11 RX ADMIN — Medication 1 TABLET(S): at 12:21

## 2025-05-11 RX ADMIN — Medication 125 MICROGRAM(S): at 05:26

## 2025-05-11 RX ADMIN — Medication 1000 UNIT(S): at 12:20

## 2025-05-11 NOTE — DISCHARGE NOTE PROVIDER - NSDCMRMEDTOKEN_GEN_ALL_CORE_FT
Probiotic Formula (Bacillus Coagulans) oral capsule: 1 cap(s) orally once a day  Synthroid 125 mcg (0.125 mg) oral tablet: 1 tab(s) orally once a day  Vitamin D3 25 mcg/10 mL (1000 intl units/10 mL) oral liquid: 10 microgram(s) orally once a day   clobetasol 0.05% topical ointment: Apply topically to affected area 2 times a day Please apply to your rash 2 times a day for 2 weeks then stop for 1 week. Please start using ointment again after stopping x1 week  Probiotic Formula (Bacillus Coagulans) oral capsule: 1 cap(s) orally once a day  Synthroid 125 mcg (0.125 mg) oral tablet: 1 tab(s) orally once a day  Vitamin D3 25 mcg/10 mL (1000 intl units/10 mL) oral liquid: 10 microgram(s) orally once a day

## 2025-05-11 NOTE — H&P ADULT - PROBLEM SELECTOR PLAN 7
Patient follows with endocrinologist outpatient, expresses interest in bisphosphate  - outpatient follow up

## 2025-05-11 NOTE — H&P ADULT - HISTORY OF PRESENT ILLNESS
70F pmhx HTN, HLD, hypothyroidism, psoriasis sent in by dermatologist for psoriasis exacerbation and poor PO intake 70F pmhx HTN, HLD, hypothyroidism, psoriasis sent in by dermatologist for psoriasis exacerbation and poor PO intake. She reported her psoriasis was well controlled on Stellara previously but she was switched to Tremfya which she experienced the adverse effect of joint pain/arthritis. She requested that the medication be discontinued and since 2022 she had been off of biolgoical agents. She went to [place] in Dec 2024 and had an atraumatic stress fracture to the pelvis. Her dermatologist felt this was the cause of her psoriasis flaring up again for the past 1 month. She notices an pruritic erythematous rash covering her b/l arms, trunk, back, legs, genital but sparing palms, soles, and mouth. No oral lesions. She feels that this is not similar to her previous psoriasis episodes, but her dermatologist told her it is so she was going to be restarted on biologics. About 1 week ago she began having n/v andpoor PO intake. Denies fever, abdominal pain, diarrhea.  Nevertheless, she presented to the outpatient dermatolgoist for reinitiation of biologics for psoriasis but the pre-treatment labs showed transaminitis. She does not know the exact numbers and they are not available for review  Denies new medications, recent abx, herbal supplements. Denies new detergents, clothes.      ED course: Zofran, Benadryl, pepcid, 1L NS 70F pmhx HTN, HLD, hypothyroidism, psoriasis sent in by dermatologist for psoriasis exacerbation and poor PO intake. She reported her psoriasis was well controlled on Stellara previously but she was switched to Tremfya which she experienced the adverse effect of joint pain/arthritis. She requested that the medication be discontinued and since 2022 she had been off of biolgoical agents. She went to [place] in Dec 2024 and had an atraumatic stress fracture to the pelvis. Her dermatologist felt this was the cause of her psoriasis flaring up again for the past 1 month. She notices an pruritic erythematous rash covering her b/l arms, trunk, back, legs, genital but sparing palms, soles, and mouth. No oral lesions. She feels that this is not similar to her previous psoriasis episodes, but her dermatologist told her it is so she was going to be restarted on biologics. About 1 week ago she began having n/v and poor PO intake. Denies fever, abdominal pain, diarrhea.  Nevertheless, she presented to the outpatient dermatolgoist for reinitiation of biologics for psoriasis but the pre-treatment labs showed transaminitis. She does not know the exact numbers and they are not available for review  Denies new medications, recent abx, herbal supplements. Denies new detergents, clothes.      ED course: Zofran, Benadryl, pepcid, 1L NS

## 2025-05-11 NOTE — H&P ADULT - PROBLEM SELECTOR PLAN 2
Mild transaminits, hepatocellular pattern  Unclear etiology at this time. Would consider medication effect, but patient denies changes in medications  - if worsening, can get RUQ US  - since out-patient dermatologist felt it was related to dehydration, can trial mIVF

## 2025-05-11 NOTE — CONSULT NOTE ADULT - SUBJECTIVE AND OBJECTIVE BOX
HPI:  70F pmhx HTN, HLD, hypothyroidism, psoriasis sent in by dermatologist for psoriasis exacerbation and poor PO intake. She reported her psoriasis was well controlled on Stellara previously but she was switched to Tremfya which she experienced the adverse effect of joint pain/arthritis. She requested that the medication be discontinued and since 2022 she had been off of biolgoical agents. She went to [place] in Dec 2024 and had an atraumatic stress fracture to the pelvis. Her dermatologist felt this was the cause of her psoriasis flaring up again for the past 1 month. She notices an pruritic erythematous rash covering her b/l arms, trunk, back, legs, genital but sparing palms, soles, and mouth. No oral lesions. She feels that this is not similar to her previous psoriasis episodes, but her dermatologist told her it is so she was going to be restarted on biologics. About 1 week ago she began having n/v and poor PO intake. Denies fever, abdominal pain, diarrhea.  Nevertheless, she presented to the outpatient dermatolgoist for reinitiation of biologics for psoriasis but the pre-treatment labs showed transaminitis. She does not know the exact numbers and they are not available for review  Denies new medications, recent abx, herbal supplements. Denies new detergents, clothes.  ED course: Zofran, Benadryl, pepcid, 1L NS (11 May 2025 04:31)    Dermatology HPI:  Patient states that she first developed psoriasis in her 30s as she saw a dermatologist due to some nail changes.  For many years, she was using topicals with adequate control of her psoriasis, and shortly after Enbrel was made available for psoriasis, started to use for management of psoriasis.  Patient was switched to Stelara with adequate control of her psoriasis.  She was switched by her outpatient dermatologist to Tremfya given possible arthritis in the joints of the hands.  States she was not evaluated by rheumatology, but several months after starting Tremfya developed worsening arthritis which she noted online was a side effect of the medication.  Endorses that she spoke with her dermatologist about switching back to Stelara, and instead self discontinued Tremfya given her symptoms and continue to use topicals for management of her psoriasis which was typically located on the extensor elbows and knees.  For the past 3 year period leading up to December 2024, patient's psoriasis was well controlled, but after a trip to [facility] where she walked extensive distances, developed some pain in the hip which was evaluated by an orthopedist and she was noted to have some hairline pelvic fractures.  Shortly after this discovery in January 2025, patient developed a red rash of the body which is non-itchy, nonpainful but she noted that it had been progressing slowly since January 2025 with little improvement on topical clobetasol which was more recently prescribed by her outpatient dermatologist's office.  She was most recently seen on Saturday, May 10 by dermatology PA who recommended hospital admission for IV rehydration.  During this visit, a biopsy was taken of the left shoulder for further workup of her rash.  Patient denies any chronic fevers, new joint pain, gastrointestinal symptoms, recent travel with the exception of trip to [facility] in winter 2024, or any recent illnesses preceding her current rash/symptoms.  Patient states that she needs to catch up on her age-appropriate screening including a colonoscopy and a mammogram.      PAST MEDICAL & SURGICAL HISTORY:  Hypertension  Hypothyroidism  Dyslipidemia  Psoriasis  Stress fracture  Left foot  Osteopenia  History of osteoarthritis  Psoriasis  Varicose vein of leg  removal of varicose vein LLE 2012  Status post bunionectomy  left  S/P foot surgery, right    MEDICATIONS  (STANDING):  cholecalciferol 1000 Unit(s) Oral daily  clobetasol 0.05% Ointment 1 Application(s) Topical two times a day  lactated ringers. 1000 milliLiter(s) (75 mL/Hr) IV Continuous <Continuous>  lactobacillus acidophilus 1 Tablet(s) Oral daily  levothyroxine 125 MICROGram(s) Oral daily  multivitamin 1 Tablet(s) Oral daily    MEDICATIONS  (PRN):      Allergies    No Known Drug Allergies  peanuts (Other)  Intolerances  Actonel (Muscle Pain)      SOCIAL HISTORY:    FAMILY HISTORY:  No pertinent family history in first degree relatives    Vital Signs Last 24 Hrs  T(C): 36.5 (11 May 2025 02:27), Max: 36.8 (10 May 2025 21:55)  T(F): 97.7 (11 May 2025 02:27), Max: 98.3 (10 May 2025 21:55)  HR: 80 (11 May 2025 02:27) (75 - 98)  BP: 151/76 (11 May 2025 02:27) (151/76 - 173/91)  BP(mean): 106 (11 May 2025 01:06) (106 - 106)  RR: 18 (11 May 2025 02:27) (18 - 18)  SpO2: 98% (11 May 2025 02:27) (97% - 100%)    Parameters below as of 11 May 2025 02:27  Patient On (Oxygen Delivery Method): room air      PHYSICAL EXAM:   The patient was alert and in no apparent distress.  There was no visible lymphadenopathy.  Conjunctiva were non injected  There was no clubbing or edema of extremities.    Of note on skin exam:   - Patient with thin, confluent partially blanchable reddish plaques present on the abdomen and thighs, and medial thighs, these plaques are slightly macerated  - Patient with many confluent thin papules and plaques also on the upper greater than lower extremities  - Relative sparing of the chest and entire back  - Little involvement of the dorsum of the feet, plantar feet are clear  - Little involvement of the dorsal hands, with clubbing of all fingernails without any overt onychodystrophy  - Conjunctiva white      LABS:                        13.9   6.24  )-----------( 195      ( 11 May 2025 07:20 )             43.2     05-11    136  |  104  |  13  ----------------------------<  87  3.8   |  18[L]  |  0.71    Ca    8.2[L]      11 May 2025 07:20  Phos  2.4     05-11  Mg     1.9     05-11    TPro  7.6  /  Alb  3.1[L]  /  TBili  0.7  /  DBili  x   /  AST  69[H]  /  ALT  53[H]  /  AlkPhos  76  05-11      Urinalysis Basic - ( 11 May 2025 07:20 )    Color: x / Appearance: x / SG: x / pH: x  Gluc: 87 mg/dL / Ketone: x  / Bili: x / Urobili: x   Blood: x / Protein: x / Nitrite: x   Leuk Esterase: x / RBC: x / WBC x   Sq Epi: x / Non Sq Epi: x / Bacteria: x      RADIOLOGY & ADDITIONAL STUDIES:  < from: US Abdomen Upper Quadrant Right (05.11.25 @ 10:07) >  IMPRESSION:    1. There is diffusely increased echogenicity of the liverparenchyma   compatible with moderate diffuse hepatic steatosis. Suggest clinical and   laboratory correlation.  2. Small gallbladder polyp. The gallbladder is otherwise unremarkable. No   gallstones are identified. No biliary dilatation.    < end of copied text >

## 2025-05-11 NOTE — PATIENT PROFILE ADULT - FALL HARM RISK - RISK INTERVENTIONS
Monitor for mental status changes/Monitor gait and stability/Use of alarms - bed, chair and/or voice tab

## 2025-05-11 NOTE — H&P ADULT - TIME-BASED BILLING (NON-CRITICAL CARE)
Psychiatric Progress Note


Vital Signs: 


 Vital Signs











 Period  Temp  Pulse  Resp  BP Sys/Leal  Pulse Ox


 


 Last 24 Hr  97.6 F  96  20-20  122/82  











Date of Session: 04/15/18


Chief Complaint:: Discharge Note


HPI: Patient addressing Cannabis Dependence comorbid with Schizoaffective 

Disorder, Substance-Induced Mood Disorder and Substance-Induced Sleep Disorder


ROS: Eczematous dermatitis, Seasonal allergy


Current Medications: 


Active Medications











Generic Name Dose Route Start Last Admin





  Trade Name Freq  PRN Reason Stop Dose Admin


 


Acetaminophen  650 mg  03/22/18 17:09  





  Tylenol -  PO   





  Q4H PRN   





  FEVER   


 


Al Hydroxide/Mg Hydroxide  30 ml  03/22/18 17:09  





  Mylanta Oral Suspension -  PO   





  Q6H PRN   





  DYSPEPSIA   


 


Colloidal Oatmeal  1 applic  03/22/18 17:11  





  Aveeno Soap -  TP   





  DAILY PRN   





  HYGEINE   


 


Cyclobenzaprine HCl  5 mg  04/10/18 15:45  04/15/18 06:16





  Cyclobenzaprine Hcl  PO   5 mg





  TID CHICHO   Administration


 


Eucalyptus/Menthol/Phenol/Sorbitol  1 each  03/22/18 17:09  04/03/18 06:41





  Cepastat Lozenge -  MM   1 each





  Q4H PRN   Administration





  SORE THROAT   


 


Fluticasone Propionate  2 spray  04/07/18 10:00  04/15/18 09:37





  Flonase -  NS   2 sprays





  DAILY CHICHO   Administration


 


Glycopyrrolate  2 mg  03/27/18 17:00  04/15/18 07:11





  Robinul -  PO   2 mg





  BID@0800,1700 CHICHO   Administration


 


Guaifenesin  10 ml  03/22/18 17:09  





  Robitussin Dm -  PO   





  Q6H PRN   





  COUGH   


 


Hydroxyzine Pamoate  50 mg  03/22/18 17:09  





  Vistaril -  PO   





  Q4H PRN   





  AGITATION   


 


Ibuprofen  400 mg  03/22/18 17:09  





  Motrin -  PO   





  Q6H PRN   





  Pain level 4-6   


 


Lidocaine  1 patch  04/10/18 15:45  04/15/18 09:37





  Lidoderm Patch -  TP   1 patch





  DAILY CHICHO   Administration


 


Loperamide HCl  4 mg  03/22/18 17:09  





  Imodium -  PO   





  Q6H PRN   





  DIARRHEA   


 


Loratadine  10 mg  04/03/18 14:15  04/15/18 09:37





  Claritin -  PO   10 mg





  DAILY CHICHO   Administration


 


Magnesium Citrate  300 ml  03/22/18 17:09  





  Citroma -  PO   





  Q48H PRN   





  CONSTIPATION   


 


Magnesium Hydroxide  30 ml  03/22/18 17:09  





  Milk Of Magnesia -  PO   





  DAILY PRN   





  CONSTIPATION   


 


Melatonin  5 mg  03/22/18 22:00  04/14/18 21:17





  Melatonin  PO   5 mg





  HS CHICHO   Administration


 


Miscellaneous  1 each  04/10/18 22:00  04/14/18 21:18





  Lidoderm Patch Removal  MC   1 each





  DAILY@2200 CHICHO   Administration


 


Non-Formulary Medication  117 mg  04/13/18 10:00  04/13/18 09:55





  Paliperidone Palmitate [Invega Sustenna]  IM   117 mg





  Q30D CHICHO   Administration


 


Prenatal Multivit/Folic Acid/Iron  1 tab  03/23/18 10:00  04/15/18 09:37





  Prenatal Vitamins (Sjr) -  PO   1 tab





  DAILY CHICHO   Administration


 


Pseudoephedrine/Triprolidine  1 combo  03/22/18 17:09  04/03/18 06:41





  Actifed -  PO   1 combo





  TID PRN   Administration





  NASAL CONGESTION   


 


Quetiapine Fumarate  200 mg  03/22/18 22:00  04/14/18 21:17





  Seroquel -  PO   200 mg





  HS CHICHO   Administration


 


Thiamine HCl  100 mg  03/22/18 22:00  04/14/18 21:17





  Vitamin B1 -  PO   100 mg





  HS CHICHO   Administration











Current Side Effect: No


Lab tests ordered: Yes


Lab tests reviewed: Yes


Provider note:: Patient will complete this program on 4/16/18. He has met his 

treatment goals and will continue to address his issues in long term treatment 

at [facility]). Told writer that from his 

participation in this program, he has learned to stay busy by doing things like 

getting his GED. He responded well to Seroquel 200 mg po HS and Invega Sustenna 

117 mg IM given on 4/13/18(next dose due on 5/11/18). Scrpipt for 30 day supply 

of Seroquel electronically transferred to [facility]. He is stable for 

discharge on 4/16/18


Total face to face time:: 35





Mental Status Exam





- Mental Status Exam


Alert and Oriented to: Time, Place, Person


Cognitive Function: Fair


Patient Appearance: Well Groomed


Mood: Hopeful, Euthymic


Affect: Appropriate


Patient Behavior: Cooperative


Speech Pattern: Clear


Voice Loudness: Normal


Thought Process: Intact, Goal Oriented


Thought Disorder: Not Present


Hallucinations: Denies


Suicidal Ideation: Denies


Homicidal Ideation: Denies


Insight/Judgement: Fair


Sleep: Fair


Appetite: Good


Muscle strength/Tone: Normal


Gait/Station: Normal





Psychiatric Treatment Plan





- Problem List


(1) Cannabis dependence


Current Visit: Yes   





(2) Schizoaffective disorder


Current Visit: Yes   





(3) Bipolar I disorder


Current Visit: No   





(4) PTSD (post-traumatic stress disorder)


Current Visit: Yes   





(5) Substance induced mood disorder


Current Visit: Yes   





(6) Substance-induced sleep disorder


Current Visit: Yes   





(7) Eczematous dermatitis


Current Visit: Yes   


Qualifiers: 


   Eczema type: unspecified   Qualified Code(s): L30.9 - Dermatitis, 

unspecified   





(8) Seasonal allergies


Current Visit: Yes   


Qualifiers: 


   Allergic rhinitis trigger: unspecified   Qualified Code(s): J30.2 - Other 

seasonal allergic rhinitis   


Initial treatment plan: Patient is discharged tomorrow and referred to [facility] for 

long term residential treatment Time-based billing (NON-critical care)

## 2025-05-11 NOTE — H&P ADULT - NSHPLABSRESULTS_GEN_ALL_CORE
LABS:                        14.2   7.41  )-----------( 323      ( 10 May 2025 22:28 )             42.6     05-10    134[L]  |  100  |  15  ----------------------------<  89  3.4[L]   |  18[L]  |  0.86    Ca    9.0      10 May 2025 22:28    TPro  8.8[H]  /  Alb  3.7  /  TBili  0.7  /  DBili  x   /  AST  95[H]  /  ALT  66[H]  /  AlkPhos  87  05-10          Urinalysis Basic - ( 10 May 2025 22:28 )    Color: x / Appearance: x / SG: x / pH: x  Gluc: 89 mg/dL / Ketone: x  / Bili: x / Urobili: x   Blood: x / Protein: x / Nitrite: x   Leuk Esterase: x / RBC: x / WBC x   Sq Epi: x / Non Sq Epi: x / Bacteria: x      I&O's Summary    BNP    RADIOLOGY & ADDITIONAL STUDIES:

## 2025-05-11 NOTE — DISCHARGE NOTE PROVIDER - NSDCCPCAREPLAN_GEN_ALL_CORE_FT
PRINCIPAL DISCHARGE DIAGNOSIS  Diagnosis: Psoriasis  Assessment and Plan of Treatment: You came to the hospital due to elevated liver numbers on labs. This likely occurred due to drinking wine  We did an ultrasound of your liver which showed     PRINCIPAL DISCHARGE DIAGNOSIS  Diagnosis: Psoriasis  Assessment and Plan of Treatment: You came to the hospital due to elevated liver numbers on labs. This likely occurred due to drinking wine before tests. But, we had to rule out other causes of increased liver numbers. We did an ultrasound of your liver which showed***. With IV fluids, your liver numbers decreased. Due to the type of psoriasis that you have, your body may loose fluids more quickly. Please try to stay hydrated and follow up with your primary care doctor for monitoring of your liver numbers.  We also sent labs that should be checked before starting any immunotherapy (Quantiferon and a hepatitis panel). Please have your dermatologist send a fax to [phone] to request these labs.  Please return to the hospital if you develop severe abdominal pain.     PRINCIPAL DISCHARGE DIAGNOSIS  Diagnosis: Psoriasis  Assessment and Plan of Treatment: You came to the hospital due to elevated liver numbers on labs. This likely occurred due to drinking wine before tests. But, we had to rule out other causes of increased liver numbers. We did an ultrasound of your liver which showed a fatty liver. With IV fluids, your liver numbers decreased. Due to the type of psoriasis that you have, your body may loose fluids more quickly. Please try to stay hydrated and follow up with your primary care doctor for monitoring of your liver numbers.  We also sent labs that should be checked before starting any immunotherapy (Quantiferon, flow cytometry and a hepatitis panel). Please follow up with you dermatologist for continued management of your psoriasis.  Please return to the hospital if you develop severe abdominal pain.

## 2025-05-11 NOTE — H&P ADULT - TIME BILLING
reviewing prior documentation, reviewing available recent outpatient records, independently obtaining a history and interpreting results of tests, performing a physical examination, reviewing tests/imaging, discussing the plan with the patient, counseling and educating the patient/, ordering medications/tests, documenting clinical information in the electronic health record, and coordinating care.  The time spent during this encounter (75 minutes) excludes teaching and separately reported services

## 2025-05-11 NOTE — DISCHARGE NOTE NURSING/CASE MANAGEMENT/SOCIAL WORK - PATIENT PORTAL LINK FT
You can access the FollowMyHealth Patient Portal offered by [facility] by registering at the following website: [email or web address]. By joining [facility]’s FollowMyHealth portal, you will also be able to view your health information using other applications (apps) compatible with our system.

## 2025-05-11 NOTE — PROGRESS NOTE ADULT - ATTENDING COMMENTS
patient was diagnosed at age 30 with psoriasis, controlled with Stelara in the past, worsening joint symptoms when switched to Tremfya.  Last Stelara use was 3 years ago, self discontinued due to lost of follow  up.  Recently flared up a month ago and progressively worsen with diffuse pruritis rash refractory to outpatient treatment with photo therapy and topicals admitted to initiate biologics for psoriasis flare  - dehydration,/FTT due to nausea, unable to tolerate PO and elevated transaminase, will treat with IVF and symptom management  - elevated LFT's with nausea, but no abdominal pain or jaundice, can be due to psoriasis flare, no recent viral illness, new meds or alcohol use.  will monitor with hydration, RUQ w/ hepatic steatosis, will need outpatient f/u, check acute hepatitis panel  - will r/o infection, TB prior to initiation of biologics  - derm consulted, f/u recs    Rest of care per plan above  D/W HS3

## 2025-05-11 NOTE — DISCHARGE NOTE NURSING/CASE MANAGEMENT/SOCIAL WORK - FINANCIAL ASSISTANCE
[facility] provides services at a reduced cost to those who are determined to be eligible through [facility]’s financial assistance program. Information regarding [facility]’s financial assistance program can be found by going to [email or web address] or by calling [phone].

## 2025-05-11 NOTE — DISCHARGE NOTE PROVIDER - PROVIDER TOKENS
FREE:[LAST:[[facility]],PHONE:[phone]],FAX:[(   [phone]    -],ADDRESS:[[address] [place] [address]],FOLLOWUP:[2 weeks],ESTABLISHEDPATIENT:[T]]

## 2025-05-11 NOTE — H&P ADULT - PROBLEM SELECTOR PLAN 3
History of nausea and transaminitis, could suggest choledocholithiasis?   - antiemetics  - if worsening, can get RUQ US

## 2025-05-11 NOTE — H&P ADULT - PROBLEM SELECTOR PLAN 1
Scattered erythematous macular rash involving arms, armpits, trunk, lower back, posterior calf, popliteal fossa, inner thigh.  Patient reports  involvement. Denies oral involvement  Patient states this is different from her previous psoriasis rash  Unclear if this represents psoriases or other etiology given concurrent transaminitis  - f/u derm consult in AM; ED note reports derm was already consulted

## 2025-05-11 NOTE — H&P ADULT - NSHPREVIEWOFSYSTEMS_GEN_ALL_CORE
REVIEW OF SYSTEMS:    CONSTITUTIONAL: No weakness, fevers or chills  EYES/ENT: No visual changes;  No vertigo or throat pain   NECK: No pain or stiffness  RESPIRATORY: No cough, wheezing, hemoptysis; No shortness of breath  CARDIOVASCULAR: No chest pain or palpitations  GASTROINTESTINAL: No abdominal or epigastric pain. No nausea, vomiting, or hematemesis; No diarrhea or constipation. No melena or hematochezia.  GENITOURINARY: No dysuria, frequency or hematuria  NEUROLOGICAL: No numbness or weakness  SKIN: as per HPI  All other review of systems is negative unless indicated above.

## 2025-05-11 NOTE — H&P ADULT - ASSESSMENT
70F pmhx HTN, HLD, hypothyroidism, psoriasis presents with erythematous macular rash involving arms, armpits, trunk, lower back, posterior calf, popliteal fossa, inner thigh that she thinks is different from her normal psoriasis rash and transaminitis.

## 2025-05-11 NOTE — PROGRESS NOTE ADULT - PROBLEM SELECTOR PLAN 3
History of nausea and transaminitis, could suggest choledocholithiasis?   - antiemetics  - if worsening, can get RUQ US History of nausea and transaminitis, could suggest choledocholithiasis?   - antiemetics  - fu RUQ US History of nausea and transaminitis, could suggest choledocholithiasis?   - antiemetics  - fu RUQ US - hepatic steatosis, gall bladder polyp

## 2025-05-11 NOTE — DISCHARGE NOTE PROVIDER - NSFOLLOWUPCLINICS_GEN_ALL_ED_FT
[facility]  Internal Medicine  [address]  [place] [address]  Phone: [phone]  Fax:   Follow Up Time: 2 weeks

## 2025-05-11 NOTE — PROGRESS NOTE ADULT - SUBJECTIVE AND OBJECTIVE BOX
SHALINI SCOTT ([id number])- Patient is a 70y old  Female who presents with a chief complaint of poor PO intake (11 May 2025 04:31)      INTERVAL HPI/OVERNIGHT EVENTS:   Patient has no acute events overnight,     SUBJECTIVE: Pt seen at bedside; denies n/v, sob, chest pain.     PHYSICAL EXAM:  - GENERAL: Follows conversation appropriately. No acute distress.  - EYES: Tracks me in room.   - HENT: Moist mucous membranes. No scleral icterus.   - LUNGS: Clear to auscultation bilaterally. No accessory muscle use.  - CARDIOVASCULAR: Regular rate and rhythm. No murmur.  - ABDOMEN: Soft, non-tender and non-distended. No palpable masses.  - EXTREMITIES: No edema. Non-tender.  - SKIN: No rashes or lesions. Warm.  - NEUROLOGIC: No focal neurological deficits. CN II-XII grossly intact, but not individually tested.  - PSYCHIATRIC: Cooperative. Appropriate mood and affect.    Vital Signs Last 24 Hrs  T(C): 36.5 (11 May 2025 02:27), Max: 36.8 (10 May 2025 21:55)  T(F): 97.7 (11 May 2025 02:27), Max: 98.3 (10 May 2025 21:55)  HR: 80 (11 May 2025 02:27) (75 - 98)  BP: 151/76 (11 May 2025 02:27) (151/76 - 173/91)  BP(mean): 106 (11 May 2025 01:06) (106 - 106)  RR: 18 (11 May 2025 02:27) (18 - 18)  SpO2: 98% (11 May 2025 02:27) (97% - 100%)    Parameters below as of 11 May 2025 02:27  Patient On (Oxygen Delivery Method): room air        LABS:                          14.2   7.41  )-----------( 323      ( 10 May 2025 22:28 )             42.6     05-10    134[L]  |  100  |  15  ----------------------------<  89  3.4[L]   |  18[L]  |  0.86    Ca    9.0      10 May 2025 22:28    TPro  8.8[H]  /  Alb  3.7  /  TBili  0.7  /  DBili  x   /  AST  95[H]  /  ALT  66[H]  /  AlkPhos  87  05-10          Urinalysis Basic - ( 10 May 2025 22:28 )    Color: x / Appearance: x / SG: x / pH: x  Gluc: 89 mg/dL / Ketone: x  / Bili: x / Urobili: x   Blood: x / Protein: x / Nitrite: x   Leuk Esterase: x / RBC: x / WBC x   Sq Epi: x / Non Sq Epi: x / Bacteria: x        Lactate Trend        CAPILLARY BLOOD GLUCOSE          Medications:  cholecalciferol 1000 Unit(s) Oral daily  lactated ringers. 1000 milliLiter(s) IV Continuous <Continuous>  lactobacillus acidophilus 1 Tablet(s) Oral daily  levothyroxine 125 MICROGram(s) Oral daily  multivitamin 1 Tablet(s) Oral daily      RADIOLOGY & ADDITIONAL TESTS were viewed by me personally.   EKG:    SHALINI SCOTT ([id number])- Patient is a 70y old  Female who presents with a chief complaint of poor PO intake (11 May 2025 04:31)      INTERVAL HPI/OVERNIGHT EVENTS:   Patient admitted overnight for management of elevated LFTs    SUBJECTIVE: Pt seen at bedside; denies n/v, sob, chest pain, abd/epigastric pain.     PHYSICAL EXAM:  - GENERAL: Follows conversation appropriately. No acute distress.  - EYES: Tracks me in room.   - HENT: Moist mucous membranes. No scleral icterus.   - LUNGS: Clear to auscultation bilaterally. No accessory muscle use.  - CARDIOVASCULAR: Regular rate and rhythm. No murmur.  - ABDOMEN: Soft, non-tender and non-distended  - EXTREMITIES: No edema. Non-tender.  - SKIN: diffuse macular rash sparing face and palms  - NEUROLOGIC: No focal neurological deficits. CN II-XII grossly intact, but not individually tested.  - PSYCHIATRIC: Cooperative. Appropriate mood and affect.    Vital Signs Last 24 Hrs  T(C): 36.5 (11 May 2025 02:27), Max: 36.8 (10 May 2025 21:55)  T(F): 97.7 (11 May 2025 02:27), Max: 98.3 (10 May 2025 21:55)  HR: 80 (11 May 2025 02:27) (75 - 98)  BP: 151/76 (11 May 2025 02:27) (151/76 - 173/91)  BP(mean): 106 (11 May 2025 01:06) (106 - 106)  RR: 18 (11 May 2025 02:27) (18 - 18)  SpO2: 98% (11 May 2025 02:27) (97% - 100%)    Parameters below as of 11 May 2025 02:27  Patient On (Oxygen Delivery Method): room air        LABS:                          14.2   7.41  )-----------( 323      ( 10 May 2025 22:28 )             42.6     05-10    134[L]  |  100  |  15  ----------------------------<  89  3.4[L]   |  18[L]  |  0.86    Ca    9.0      10 May 2025 22:28    TPro  8.8[H]  /  Alb  3.7  /  TBili  0.7  /  DBili  x   /  AST  95[H]  /  ALT  66[H]  /  AlkPhos  87  05-10          Urinalysis Basic - ( 10 May 2025 22:28 )    Color: x / Appearance: x / SG: x / pH: x  Gluc: 89 mg/dL / Ketone: x  / Bili: x / Urobili: x   Blood: x / Protein: x / Nitrite: x   Leuk Esterase: x / RBC: x / WBC x   Sq Epi: x / Non Sq Epi: x / Bacteria: x        Lactate Trend        CAPILLARY BLOOD GLUCOSE          Medications:  cholecalciferol 1000 Unit(s) Oral daily  lactated ringers. 1000 milliLiter(s) IV Continuous <Continuous>  lactobacillus acidophilus 1 Tablet(s) Oral daily  levothyroxine 125 MICROGram(s) Oral daily  multivitamin 1 Tablet(s) Oral daily      RADIOLOGY & ADDITIONAL TESTS were viewed by me personally.   EKG:

## 2025-05-11 NOTE — DISCHARGE NOTE PROVIDER - CARE PROVIDER_API CALL
[facility],   [address] [address]  Phone: [phone]  Fax: (   )    -  Established Patient  Follow Up Time: 2 weeks

## 2025-05-11 NOTE — H&P ADULT - ATTENDING COMMENTS
patient was diagnosed at age 30 with psoriasis, controlled with Stelara in the past, worsening joint symptoms when switched to Tremfya.  Last Stelara use was 3 years ago, self discontinued due to lost of follow  up.  Recently flared up a month ago and progressively worsen with diffuse pruritis rash refractory to outpatient treatment with photo therapy and topicals admitted to initiate biologics for psoriasis flare  - dehydration,/FTT due to nausea, unable to tolerate PO and elevated transaminase, will treat with IVF and symptom manangement  - elevated LFT's with nausea, but no abdominal pain or jaundice, can be due to psoriasis flare, no recent viral illness, new meds or alcohol use.  will monitor with hydration, will check RUQ sono, acute hepatitis panel  - will r/o infection, TB prior to initiation of biologics  - awaiting Derm consult for recommendations,   - discussed Dr. Noah Hernandez

## 2025-05-11 NOTE — DISCHARGE NOTE PROVIDER - HOSPITAL COURSE
HPI:  70F pmhx HTN, HLD, hypothyroidism, psoriasis sent in by dermatologist for psoriasis exacerbation and poor PO intake. She reported her psoriasis was well controlled on Stellara previously but she was switched to Tremfya which she experienced the adverse effect of joint pain/arthritis. She requested that the medication be discontinued and since 2022 she had been off of biolgoical agents. She went to [place] in Dec 2024 and had an atraumatic stress fracture to the pelvis. Her dermatologist felt this was the cause of her psoriasis flaring up again for the past 1 month. She notices an pruritic erythematous rash covering her b/l arms, trunk, back, legs, genital but sparing palms, soles, and mouth. No oral lesions. She feels that this is not similar to her previous psoriasis episodes, but her dermatologist told her it is so she was going to be restarted on biologics. About 1 week ago she began having n/v and poor PO intake. Denies fever, abdominal pain, diarrhea.  Nevertheless, she presented to the outpatient dermatologist for reinitiation of biologics for psoriasis but the pre-treatment labs showed transaminitis. She does not know the exact numbers and they are not available for review  Denies new medications, recent abx, herbal supplements. Denies new detergents, clothes.      ED course: Zofran, Benadryl, pepcid, 1L NS (11 May 2025 04:31)    During admission, pt started on IVF. LFTs downtrending. Pt reports having some wine the night prior to initial LFTs. RUQ showed ***.    Pt seen by dermatology who recommended ***.    Patient is stable for discharge.    Medication Changes:    Follow up:  Derm  PCP   HPI:  70F pmhx HTN, HLD, hypothyroidism, psoriasis sent in by dermatologist for psoriasis exacerbation and poor PO intake. She reported her psoriasis was well controlled on Stellara previously but she was switched to Tremfya which she experienced the adverse effect of joint pain/arthritis. She requested that the medication be discontinued and since 2022 she had been off of biolgoical agents. She went to [place] in Dec 2024 and had an atraumatic stress fracture to the pelvis. Her dermatologist felt this was the cause of her psoriasis flaring up again for the past 1 month. She notices an pruritic erythematous rash covering her b/l arms, trunk, back, legs, genital but sparing palms, soles, and mouth. No oral lesions. She feels that this is not similar to her previous psoriasis episodes, but her dermatologist told her it is so she was going to be restarted on biologics. About 1 week ago she began having n/v and poor PO intake. Denies fever, abdominal pain, diarrhea.  Nevertheless, she presented to the outpatient dermatologist for reinitiation of biologics for psoriasis but the pre-treatment labs showed transaminitis. She does not know the exact numbers and they are not available for review  Denies new medications, recent abx, herbal supplements. Denies new detergents, clothes.      ED course: Zofran, Benadryl, pepcid, 1L NS (11 May 2025 04:31)    During admission, pt started on IVF. LFTs downtrending. Pt reports having some wine the night prior to initial LFTs. RUQ showed diffusely increased echogenicity of the liver parenchyma compatible with moderate diffuse hepatic steatosis and a small gallbladder polyp. No biliary dilation.    Pt seen by dermatology who recommended ***.    Patient is stable for discharge.    Medication Changes:    Follow up:  Derm  PCP   HPI:  70F pmhx HTN, HLD, hypothyroidism, psoriasis sent in by dermatologist for psoriasis exacerbation and poor PO intake. She reported her psoriasis was well controlled on Stellara previously but she was switched to Tremfya which she experienced the adverse effect of joint pain/arthritis. She requested that the medication be discontinued and since 2022 she had been off of biolgoical agents. She went to [place] in Dec 2024 and had an atraumatic stress fracture to the pelvis. Her dermatologist felt this was the cause of her psoriasis flaring up again for the past 1 month. She notices an pruritic erythematous rash covering her b/l arms, trunk, back, legs, genital but sparing palms, soles, and mouth. No oral lesions. She feels that this is not similar to her previous psoriasis episodes, but her dermatologist told her it is so she was going to be restarted on biologics. About 1 week ago she began having n/v and poor PO intake. Denies fever, abdominal pain, diarrhea.  Nevertheless, she presented to the outpatient dermatologist for reinitiation of biologics for psoriasis but the pre-treatment labs showed transaminitis. She does not know the exact numbers and they are not available for review  Denies new medications, recent abx, herbal supplements. Denies new detergents, clothes.      ED course: Zofran, Benadryl, pepcid, 1L NS (11 May 2025 04:31)    During admission, pt started on IVF. LFTs downtrending. Pt reports having some wine the night prior to initial LFTs. RUQ showed diffusely increased echogenicity of the liver parenchyma compatible with moderate diffuse hepatic steatosis and a small gallbladder polyp. No biliary dilation.    Pt seen by dermatology who recommended flow cytometry to be sent and clobetasol ointment.    Patient is stable for discharge.    Medication Changes:  Clobetasol ointment     Follow up:  Derm  PCP

## 2025-05-11 NOTE — DISCHARGE NOTE NURSING/CASE MANAGEMENT/SOCIAL WORK - NSDCPEFALRISK_GEN_ALL_CORE
For information on Fall & Injury Prevention, visit: [email or web address] OR  [email or web address] OR  https://www.cdc.gov/steadi/patient.html

## 2025-05-11 NOTE — CONSULT NOTE ADULT - ASSESSMENT
#Dermatitis, chronic, flaring  #History of Psoriasis, >40 years, prior treatment trials: Enbrel, Stelara, Tremfya, topicals inc. Clobetasol  Broad differential for patient's rash given morphology, chronicity, and distribution: Viral exanthem, although patient does not recall recently being sick, can present with similar rash; Morbilliform drug eruption, although unlikely as patient is only taking Synthroid, Skinfix oral vitamin, Vitamin D, magnesium; Psoriasiform rash, although patient without scaliness, given her history of psoriasis, this may represent a psoriasis flare or in the right clinical setting drug induced psoriasis; Eczematous eruption, although less likely given lack of predisposing factors; NOT TO BE MISSED Paraneoplastic eruption, given that patient endorses recent weight loss and is not up to date with USPSTF appropriate screening, such as colonoscopy and mammogram; NOT TO BE MISSED Mycosis fungoides, Given morphology, chronic, gradually worsening nature, recent weight loss, age  - PLEASE ORDER flow cytometry with next blood draw for further workup of possible MF  - Discussed the r/b/a of obtaining biopsy today for further workup; given pending biopsy, hold off at this time. Also counseled patient that it may require several biopsies to establish diagnosis given current appearance of rash.  - Attempted to reach out to Ragini Del Valle PA-C to discuss case further ([facility], [phone]); [id number]  -- Team to attempt to call Dermatologist's office to determine where biopsy was sent to and ensure a copy of pathology report is forwarded to hospital / Dermatology  - Patient should ensure all USPSTF indicated age-appropriate cancer screenings are completed as soon as possible   - START Clobetasol 0.05% ointment BID to affected areas with rash; on discharge, please instruct patient to use 2wks on, 1 week break and cycle; SED   - Patient to follow up with Dermatology at [address] [place] [address], [address], [place] [address], Phone Number [phone]      Patient was seen at bedside and staffed in person with the dermatology attending Dr. Susan Posey  Recommendations were communicated with the primary team.  Please page [phone] for further related questions.    rAthur Diaz MD  Resident Physician, PGY2  [facility]  Pager: [phone]  Office: [phone]

## 2025-05-11 NOTE — DISCHARGE NOTE PROVIDER - NSDCQMCOGNITION_NEU_ALL_CORE
Post-Care Instructions: I reviewed with the patient in detail post-care instructions. Patient is to wear sunprotection, and avoid picking at any of the treated lesions. Pt may apply Vaseline to crusted or scabbing areas. Number Of Freeze-Thaw Cycles: 1 freeze-thaw cycle Render Note In Bullet Format When Appropriate: No Detail Level: Detailed Consent: The patient's consent was obtained including but not limited to risks of pain, crusting, scabbing, blistering, scarring, darker or lighter pigmentary change, recurrence, incomplete removal and infection. Duration Of Freeze Thaw-Cycle (Seconds): 5 No difficulties

## 2025-05-11 NOTE — H&P ADULT - NSHPPHYSICALEXAM_GEN_ALL_CORE
T(C): 36.5 (05-11-25 @ 02:27), Max: 36.8 (05-10-25 @ 21:55)  HR: 80 (05-11-25 @ 02:27) (75 - 98)  BP: 151/76 (05-11-25 @ 02:27) (151/76 - 173/91)  RR: 18 (05-11-25 @ 02:27) (18 - 18)  SpO2: 98% (05-11-25 @ 02:27) (97% - 100%)    GENERAL: NAD, nontoxic  EYES:  no scleral icterus  NECK: No JVD  LUNG: CTAB, no wheezes, no rhonchi, no accessory muscle use  HEART: RRR, no m/g/r  ABDOMEN: Soft, NT, ND  EXTREMITIES:  No BLE edema  NEUROLOGY: A&Ox4, grossly nonfocal  SKIN: scattered erythematous macular rash involving arms, armpits, trunk, lower back, posterior calf, popliteal fossa, inner thigh. Some scaling on popliteal fossa, but remainder or rash mostly smooth.  exam deferred

## 2025-05-11 NOTE — PROGRESS NOTE ADULT - PROBLEM SELECTOR PLAN 2
Mild transaminits, hepatocellular pattern  Unclear etiology at this time. Would consider medication effect, but patient denies changes in medications  - if worsening, can get RUQ US  - since out-patient dermatologist felt it was related to dehydration, can trial mIVF Mild transaminitis, hepatocellular pattern  Unclear etiology at this time. Would consider medication effect, but patient denies changes in medications  - pt reports drinking a few glasses of wine the night prior to labs, no new sexual partners, traveled to [place], no new tattoos, no hx of blood transfusions   - Lipase 220, no epigastric pain    Plan:  - fu RUQ US  - since out-patient dermatologist felt it was related to dehydration, can trial mIVF Mild transaminitis, hepatocellular pattern  Unclear etiology at this time. Would consider medication effect, but patient denies changes in medications  - pt reports drinking a few glasses of wine the night prior to labs, no new sexual partners, traveled to [place], no new tattoos, no hx of blood transfusions   - Lipase 220, no epigastric pain    Plan:  - fu RUQ US  - since out-patient dermatologist felt it was related to dehydration, can trial mIVF       > start clobetasol per derm

## 2025-05-11 NOTE — DISCHARGE NOTE PROVIDER - NSDCCPTREATMENT_GEN_ALL_CORE_FT
PRINCIPAL PROCEDURE  Procedure: Right upper quadrant ultrasound  Findings and Treatment:      PRINCIPAL PROCEDURE  Procedure: Right upper quadrant ultrasound  Findings and Treatment: 1. There is diffusely increased echogenicity of the liver parenchyma   compatible with moderate diffuse hepatic steatosis. Suggest clinical and   laboratory correlation.  2. Small gallbladder polyp. The gallbladder is otherwise unremarkable. No   gallstones are identified. No biliary dilatation.

## 2025-05-12 LAB
HAV IGM SER-ACNC: SIGNIFICANT CHANGE UP
HBV CORE IGM SER-ACNC: SIGNIFICANT CHANGE UP
HBV SURFACE AG SER-ACNC: SIGNIFICANT CHANGE UP
HCV AB S/CO SERPL IA: 0.14 S/CO — SIGNIFICANT CHANGE UP (ref 0–0.79)
HCV AB SERPL-IMP: SIGNIFICANT CHANGE UP

## 2025-05-13 LAB — FLOW CYTOMETRY FINAL REPORT: SIGNIFICANT CHANGE UP

## 2025-05-14 PROBLEM — L40.9 PSORIASIS, UNSPECIFIED: Chronic | Status: ACTIVE | Noted: 2025-05-10

## 2025-05-15 ENCOUNTER — NON-APPOINTMENT (OUTPATIENT)
Age: 70
End: 2025-05-15

## 2025-05-16 ENCOUNTER — APPOINTMENT (OUTPATIENT)
Dept: DERMATOLOGY | Facility: CLINIC | Age: 70
End: 2025-05-16
Payer: MEDICARE

## 2025-05-16 VITALS — BODY MASS INDEX: 23.55 KG/M2 | WEIGHT: 128 LBS | HEIGHT: 62 IN

## 2025-05-16 DIAGNOSIS — L85.3 XEROSIS CUTIS: ICD-10-CM

## 2025-05-16 PROBLEM — L20.9 ATOPIC DERMATITIS: Status: ACTIVE | Noted: 2025-05-16

## 2025-05-16 PROCEDURE — 99214 OFFICE O/P EST MOD 30 MIN: CPT

## 2025-05-16 RX ORDER — DUPILUMAB 300 MG/2ML
300 INJECTION, SOLUTION SUBCUTANEOUS
Qty: 1 | Refills: 6 | Status: ACTIVE | COMMUNITY
Start: 2025-05-16 | End: 1900-01-01

## 2025-05-18 PROBLEM — L85.3 XEROSIS CUTIS: Status: ACTIVE | Noted: 2025-05-18

## 2025-05-19 RX ORDER — DUPILUMAB 300 MG/2ML
300 INJECTION, SOLUTION SUBCUTANEOUS
Qty: 2 | Refills: 0 | Status: ACTIVE | COMMUNITY
Start: 2025-05-16

## 2025-05-21 ENCOUNTER — OUTPATIENT (OUTPATIENT)
Dept: OUTPATIENT SERVICES | Facility: HOSPITAL | Age: 70
LOS: 1 days | End: 2025-05-21

## 2025-05-21 ENCOUNTER — APPOINTMENT (OUTPATIENT)
Dept: INTERNAL MEDICINE | Facility: CLINIC | Age: 70
End: 2025-05-21

## 2025-05-21 ENCOUNTER — APPOINTMENT (OUTPATIENT)
Dept: DERMATOLOGY | Facility: CLINIC | Age: 70
End: 2025-05-21

## 2025-05-21 ENCOUNTER — NON-APPOINTMENT (OUTPATIENT)
Age: 70
End: 2025-05-21

## 2025-05-21 DIAGNOSIS — Z98.89 OTHER SPECIFIED POSTPROCEDURAL STATES: Chronic | ICD-10-CM

## 2025-05-21 DIAGNOSIS — Z98.890 OTHER SPECIFIED POSTPROCEDURAL STATES: Chronic | ICD-10-CM

## 2025-05-21 DIAGNOSIS — L20.9 ATOPIC DERMATITIS, UNSPECIFIED: ICD-10-CM

## 2025-05-21 DIAGNOSIS — I83.93 ASYMPTOMATIC VARICOSE VEINS OF BILATERAL LOWER EXTREMITIES: Chronic | ICD-10-CM

## 2025-05-21 PROCEDURE — 99213 OFFICE O/P EST LOW 20 MIN: CPT | Mod: 25

## 2025-05-21 PROCEDURE — 96372 THER/PROPH/DIAG INJ SC/IM: CPT

## 2025-06-02 PROCEDURE — 80074 ACUTE HEPATITIS PANEL: CPT

## 2025-06-02 PROCEDURE — 83735 ASSAY OF MAGNESIUM: CPT

## 2025-06-02 PROCEDURE — 76705 ECHO EXAM OF ABDOMEN: CPT

## 2025-06-02 PROCEDURE — 0241U: CPT

## 2025-06-02 PROCEDURE — 93005 ELECTROCARDIOGRAM TRACING: CPT

## 2025-06-02 PROCEDURE — 80061 LIPID PANEL: CPT

## 2025-06-02 PROCEDURE — 99285 EMERGENCY DEPT VISIT HI MDM: CPT

## 2025-06-02 PROCEDURE — 96374 THER/PROPH/DIAG INJ IV PUSH: CPT

## 2025-06-02 PROCEDURE — 83605 ASSAY OF LACTIC ACID: CPT

## 2025-06-02 PROCEDURE — 82746 ASSAY OF FOLIC ACID SERUM: CPT

## 2025-06-02 PROCEDURE — 71045 X-RAY EXAM CHEST 1 VIEW: CPT

## 2025-06-02 PROCEDURE — 87205 SMEAR GRAM STAIN: CPT

## 2025-06-02 PROCEDURE — 83690 ASSAY OF LIPASE: CPT

## 2025-06-02 PROCEDURE — 85025 COMPLETE CBC W/AUTO DIFF WBC: CPT

## 2025-06-02 PROCEDURE — 86480 TB TEST CELL IMMUN MEASURE: CPT

## 2025-06-02 PROCEDURE — 80053 COMPREHEN METABOLIC PANEL: CPT

## 2025-06-02 PROCEDURE — 88184 FLOWCYTOMETRY/ TC 1 MARKER: CPT

## 2025-06-02 PROCEDURE — 82607 VITAMIN B-12: CPT

## 2025-06-02 PROCEDURE — 84100 ASSAY OF PHOSPHORUS: CPT

## 2025-06-02 PROCEDURE — 88185 FLOWCYTOMETRY/TC ADD-ON: CPT

## 2025-06-03 ENCOUNTER — APPOINTMENT (OUTPATIENT)
Dept: DERMATOLOGY | Facility: CLINIC | Age: 70
End: 2025-06-03
Payer: MEDICARE

## 2025-06-03 DIAGNOSIS — L85.3 XEROSIS CUTIS: ICD-10-CM

## 2025-06-03 PROCEDURE — 99213 OFFICE O/P EST LOW 20 MIN: CPT

## 2025-06-09 ENCOUNTER — NON-APPOINTMENT (OUTPATIENT)
Age: 70
End: 2025-06-09

## 2025-06-11 ENCOUNTER — APPOINTMENT (OUTPATIENT)
Dept: DERMATOLOGY | Facility: CLINIC | Age: 70
End: 2025-06-11

## 2025-06-11 PROBLEM — R21 RASH: Status: ACTIVE | Noted: 2025-06-11

## 2025-06-11 PROCEDURE — 99214 OFFICE O/P EST MOD 30 MIN: CPT

## 2025-06-18 ENCOUNTER — APPOINTMENT (OUTPATIENT)
Dept: DERMATOLOGY | Facility: CLINIC | Age: 70
End: 2025-06-18
Payer: MEDICARE

## 2025-06-18 VITALS — BODY MASS INDEX: 23.78 KG/M2 | WEIGHT: 130 LBS

## 2025-06-18 PROCEDURE — 99214 OFFICE O/P EST MOD 30 MIN: CPT

## 2025-07-09 ENCOUNTER — APPOINTMENT (OUTPATIENT)
Dept: DERMATOLOGY | Facility: CLINIC | Age: 70
End: 2025-07-09
Payer: MEDICARE

## 2025-07-09 PROBLEM — L30.9 DERMATITIS: Status: ACTIVE | Noted: 2025-07-09

## 2025-07-09 PROBLEM — R79.89 ABNORMAL LFTS: Status: ACTIVE | Noted: 2025-07-09

## 2025-07-09 PROBLEM — R79.89 ELEVATED LFTS: Status: ACTIVE | Noted: 2025-07-09

## 2025-07-09 PROCEDURE — 99214 OFFICE O/P EST MOD 30 MIN: CPT

## 2025-07-09 RX ORDER — TERBINAFINE HYDROCHLORIDE 250 MG/1
250 TABLET ORAL DAILY
Qty: 30 | Refills: 0 | Status: ACTIVE | COMMUNITY
Start: 2025-07-09 | End: 1900-01-01

## 2025-07-09 RX ORDER — KETOCONAZOLE 20 MG/G
2 CREAM TOPICAL
Qty: 1 | Refills: 2 | Status: ACTIVE | COMMUNITY
Start: 2025-07-09 | End: 1900-01-01

## 2025-07-10 ENCOUNTER — TRANSCRIPTION ENCOUNTER (OUTPATIENT)
Age: 70
End: 2025-07-10

## 2025-07-11 ENCOUNTER — TRANSCRIPTION ENCOUNTER (OUTPATIENT)
Age: 70
End: 2025-07-11

## 2025-07-13 ENCOUNTER — EMERGENCY (EMERGENCY)
Facility: HOSPITAL | Age: 70
LOS: 1 days | End: 2025-07-13
Attending: STUDENT IN AN ORGANIZED HEALTH CARE EDUCATION/TRAINING PROGRAM
Payer: MEDICARE

## 2025-07-13 VITALS
TEMPERATURE: 98 F | OXYGEN SATURATION: 96 % | WEIGHT: 132.94 LBS | DIASTOLIC BLOOD PRESSURE: 78 MMHG | RESPIRATION RATE: 18 BRPM | HEIGHT: 63 IN | SYSTOLIC BLOOD PRESSURE: 168 MMHG | HEART RATE: 99 BPM

## 2025-07-13 VITALS
DIASTOLIC BLOOD PRESSURE: 72 MMHG | SYSTOLIC BLOOD PRESSURE: 175 MMHG | HEART RATE: 96 BPM | OXYGEN SATURATION: 99 % | TEMPERATURE: 98 F | RESPIRATION RATE: 17 BRPM

## 2025-07-13 DIAGNOSIS — Z98.89 OTHER SPECIFIED POSTPROCEDURAL STATES: Chronic | ICD-10-CM

## 2025-07-13 DIAGNOSIS — I83.93 ASYMPTOMATIC VARICOSE VEINS OF BILATERAL LOWER EXTREMITIES: Chronic | ICD-10-CM

## 2025-07-13 DIAGNOSIS — Z98.890 OTHER SPECIFIED POSTPROCEDURAL STATES: Chronic | ICD-10-CM

## 2025-07-13 LAB
ALBUMIN SERPL ELPH-MCNC: 3.9 G/DL — SIGNIFICANT CHANGE UP (ref 3.3–5)
ALP SERPL-CCNC: 81 U/L — SIGNIFICANT CHANGE UP (ref 40–120)
ALT FLD-CCNC: 43 U/L — SIGNIFICANT CHANGE UP (ref 10–45)
ANION GAP SERPL CALC-SCNC: 16 MMOL/L — SIGNIFICANT CHANGE UP (ref 5–17)
ANISOCYTOSIS BLD QL: SLIGHT — SIGNIFICANT CHANGE UP
AST SERPL-CCNC: 57 U/L — HIGH (ref 10–40)
BASOPHILS # BLD AUTO: 0.08 K/UL — SIGNIFICANT CHANGE UP (ref 0–0.2)
BASOPHILS # BLD MANUAL: 0.09 K/UL — SIGNIFICANT CHANGE UP (ref 0–0.2)
BASOPHILS NFR BLD AUTO: 0.8 % — SIGNIFICANT CHANGE UP (ref 0–2)
BASOPHILS NFR BLD MANUAL: 0.9 % — SIGNIFICANT CHANGE UP (ref 0–2)
BILIRUB SERPL-MCNC: 0.8 MG/DL — SIGNIFICANT CHANGE UP (ref 0.2–1.2)
BUN SERPL-MCNC: 20 MG/DL — SIGNIFICANT CHANGE UP (ref 7–23)
CALCIUM SERPL-MCNC: 8.9 MG/DL — SIGNIFICANT CHANGE UP (ref 8.4–10.5)
CHLORIDE SERPL-SCNC: 102 MMOL/L — SIGNIFICANT CHANGE UP (ref 96–108)
CO2 SERPL-SCNC: 20 MMOL/L — LOW (ref 22–31)
CREAT SERPL-MCNC: 0.96 MG/DL — SIGNIFICANT CHANGE UP (ref 0.5–1.3)
CRP SERPL-MCNC: 38 MG/L — HIGH (ref 0–4)
EGFR: 63 ML/MIN/1.73M2 — SIGNIFICANT CHANGE UP
EGFR: 63 ML/MIN/1.73M2 — SIGNIFICANT CHANGE UP
EOSINOPHIL # BLD AUTO: 0.29 K/UL — SIGNIFICANT CHANGE UP (ref 0–0.5)
EOSINOPHIL # BLD MANUAL: 0 K/UL — SIGNIFICANT CHANGE UP (ref 0–0.5)
EOSINOPHIL NFR BLD AUTO: 2.8 % — SIGNIFICANT CHANGE UP (ref 0–6)
EOSINOPHIL NFR BLD MANUAL: 0 % — SIGNIFICANT CHANGE UP (ref 0–6)
ERYTHROCYTE [SEDIMENTATION RATE] IN BLOOD: 63 MM/HR — HIGH (ref 0–20)
GLUCOSE SERPL-MCNC: 109 MG/DL — HIGH (ref 70–99)
HCT VFR BLD CALC: 43 % — SIGNIFICANT CHANGE UP (ref 34.5–45)
HGB BLD-MCNC: 14.4 G/DL — SIGNIFICANT CHANGE UP (ref 11.5–15.5)
IMM GRANULOCYTES # BLD AUTO: 0.08 K/UL — HIGH (ref 0–0.07)
IMM GRANULOCYTES NFR BLD AUTO: 0.8 % — SIGNIFICANT CHANGE UP (ref 0–0.9)
LYMPHOCYTES # BLD AUTO: 0.79 K/UL — LOW (ref 1–3.3)
LYMPHOCYTES # BLD MANUAL: 1.25 K/UL — SIGNIFICANT CHANGE UP (ref 1–3.3)
LYMPHOCYTES NFR BLD AUTO: 7.6 % — LOW (ref 13–44)
LYMPHOCYTES NFR BLD MANUAL: 12.1 % — LOW (ref 13–44)
MACROCYTES BLD QL: SLIGHT — SIGNIFICANT CHANGE UP
MANUAL NEUTROPHIL BANDS #: 0.35 K/UL — SIGNIFICANT CHANGE UP (ref 0–0.84)
MCHC RBC-ENTMCNC: 33.5 G/DL — SIGNIFICANT CHANGE UP (ref 32–36)
MCHC RBC-ENTMCNC: 36 PG — HIGH (ref 27–34)
MCV RBC AUTO: 107.5 FL — HIGH (ref 80–100)
MONOCYTES # BLD AUTO: 0.94 K/UL — HIGH (ref 0–0.9)
MONOCYTES # BLD MANUAL: 0.44 K/UL — SIGNIFICANT CHANGE UP (ref 0–0.9)
MONOCYTES NFR BLD AUTO: 9.1 % — SIGNIFICANT CHANGE UP (ref 2–14)
MONOCYTES NFR BLD MANUAL: 4.3 % — SIGNIFICANT CHANGE UP (ref 2–14)
NEUTROPHILS # BLD AUTO: 8.15 K/UL — HIGH (ref 1.8–7.4)
NEUTROPHILS # BLD MANUAL: 8.19 K/UL — HIGH (ref 1.8–7.4)
NEUTROPHILS NFR BLD AUTO: 78.9 % — HIGH (ref 43–77)
NEUTROPHILS NFR BLD MANUAL: 79.3 % — HIGH (ref 43–77)
NEUTS BAND # BLD: 3.4 % — SIGNIFICANT CHANGE UP (ref 0–8)
NEUTS BAND NFR BLD: 3.4 % — SIGNIFICANT CHANGE UP (ref 0–8)
NRBC # BLD AUTO: 0 K/UL — SIGNIFICANT CHANGE UP (ref 0–0)
NRBC # FLD: 0 K/UL — SIGNIFICANT CHANGE UP (ref 0–0)
NRBC BLD AUTO-RTO: 0 /100 WBCS — SIGNIFICANT CHANGE UP (ref 0–0)
PLAT MORPH BLD: NORMAL — SIGNIFICANT CHANGE UP
PLATELET # BLD AUTO: 308 K/UL — SIGNIFICANT CHANGE UP (ref 150–400)
PMV BLD: 9.7 FL — SIGNIFICANT CHANGE UP (ref 7–13)
POTASSIUM SERPL-MCNC: 3.6 MMOL/L — SIGNIFICANT CHANGE UP (ref 3.5–5.3)
POTASSIUM SERPL-SCNC: 3.6 MMOL/L — SIGNIFICANT CHANGE UP (ref 3.5–5.3)
PROT SERPL-MCNC: 7.8 G/DL — SIGNIFICANT CHANGE UP (ref 6–8.3)
RBC # BLD: 4 M/UL — SIGNIFICANT CHANGE UP (ref 3.8–5.2)
RBC # FLD: 14.2 % — SIGNIFICANT CHANGE UP (ref 10.3–14.5)
RBC BLD AUTO: SIGNIFICANT CHANGE UP
SODIUM SERPL-SCNC: 138 MMOL/L — SIGNIFICANT CHANGE UP (ref 135–145)
WBC # BLD: 10.33 K/UL — SIGNIFICANT CHANGE UP (ref 3.8–10.5)
WBC # FLD AUTO: 10.33 K/UL — SIGNIFICANT CHANGE UP (ref 3.8–10.5)

## 2025-07-13 PROCEDURE — 99284 EMERGENCY DEPT VISIT MOD MDM: CPT | Mod: GC

## 2025-07-13 PROCEDURE — 85025 COMPLETE CBC W/AUTO DIFF WBC: CPT

## 2025-07-13 PROCEDURE — 99285 EMERGENCY DEPT VISIT HI MDM: CPT

## 2025-07-13 PROCEDURE — 86140 C-REACTIVE PROTEIN: CPT

## 2025-07-13 PROCEDURE — 80053 COMPREHEN METABOLIC PANEL: CPT

## 2025-07-13 PROCEDURE — 99284 EMERGENCY DEPT VISIT MOD MDM: CPT

## 2025-07-13 PROCEDURE — 36415 COLL VENOUS BLD VENIPUNCTURE: CPT

## 2025-07-13 PROCEDURE — 85652 RBC SED RATE AUTOMATED: CPT

## 2025-07-13 RX ORDER — HYDROCORTISONE 10 MG/G
1 CREAM TOPICAL
Qty: 3 | Refills: 0
Start: 2025-07-13 | End: 2025-08-02

## 2025-07-13 NOTE — ED PROVIDER NOTE - PROGRESS NOTE DETAILS
derm contacted and consulted 2:45pm Emergency Medicine / Medical Toxicology Attending MD Dillard -   Patient signed out to me by Dr. Jordan.   70F, c/o full-body rash.  Has been seen by [facility] x 2 (also Bx'd x2 by them).  Been on dupixant, stz, w/o relief.  Recently started on antifungals, which she feels is now worse.  Knees, groin, trunk.   Has been kept in CDU before, but there are no beds.   Seen and cleared by Dermatology, who are recommending topical steroids (clobetasol + hydrocortisone) and close outpatient interval follow up later this week.  We have had a long conversation with the patient regarding whether or not she is comfortable with further outpatient management of this problem.  Patient is comfortable with DC home and following up with Derm later this week.  Strict return precautions.

## 2025-07-13 NOTE — CONSULT NOTE ADULT - SUBJECTIVE AND OBJECTIVE BOX
HPI: 69yo F with extensive inflammed rash over her entire trunk, groin and arms. No new medications, but was told her liver enzymes were elevated in the past. Will need labwork to evaluate for possible DIHS. No mucous involvement to suggest SJS/TENS. Sweet syndrome? Also endorses some new chills. Will consult dermatology; likely to require admission given the BSA involved.      Dermatology HPI: 70y F with PMH of Eczema presents with several day history of acutely worsening rash.  Patient has been continuously following with dermatology service in the outpatient setting for management of moderate to severe eczema and most recently saw Dr. Garza on 6/18 where patient was instructed to continue/restart Dupixent, and Dr. Horowitz on 7/9 where patient to hold Dupixent/topicals and attempt treatment of suspected tinea corporis with topical ketoconazole + PO terbinafine (had only started topicals).  Patient noted continuous worsening of rash which now encompasses the neck, entire trunk, upper extremities and lower extremities and is very itchy.  Patient presented to ED for further management given level of BSA involvement.      ACTIVE ISSUES:    PMH/PSH:  Hypertension    Hypothyroidism    Dyslipidemia    Psoriasis    Stress fracture  Left foot    Osteopenia    History of osteoarthritis    Psoriasis    Varicose vein of leg  removal of varicose vein LLE 2012    Status post bunionectomy  left    S/P foot surgery, right        REVIEW OF SYSTEMS  General: denies subjective fever/chills  Skin/Breast: see HPI  Ophthalmologic: no eye pain or change in vision  Respiratory and Thorax: denies shortness of breath  Cardiovascular: denies chest pain  Gastrointestinal: denies nausea/vomiting, constipation, diarrhea  Genitourinary: denies dysuria or hematuria  Musculoskeletal: denies joint pains or weakness	  Neurological: denies weakness or tingling  Psychiatric: denies mood changes    A 10-system ROS was performed and is negative except for those items noted above and/or in the HPI.    MEDICATIONS (HOME):  Home Medications:  Probiotic Formula (Bacillus Coagulans) oral capsule: 1 cap(s) orally once a day (11 May 2025 05:03)  Synthroid 125 mcg (0.125 mg) oral tablet: 1 tab(s) orally once a day (11 May 2025 05:03)  Vitamin D3 25 mcg/10 mL (1000 intl units/10 mL) oral liquid: 10 microgram(s) orally once a day (11 May 2025 05:03)    MEDICATIONS  (STANDING):    MEDICATIONS  (PRN):      Allergies    peanuts (Other)  No Known Drug Allergies    Intolerances    Actonel (Muscle Pain)      SOCIAL HISTORY:  Social History:  , lives with     FAMILY HISTORY:  No pertinent family history in first degree relatives        Vital Signs Last 24 Hrs  T(C): 36.6 (13 Jul 2025 16:10), Max: 36.8 (13 Jul 2025 13:54)  T(F): 97.9 (13 Jul 2025 16:10), Max: 98.3 (13 Jul 2025 13:54)  HR: 96 (13 Jul 2025 16:10) (96 - 99)  BP: 175/72 (13 Jul 2025 16:10) (168/78 - 175/72)  BP(mean): --  RR: 17 (13 Jul 2025 16:10) (17 - 18)  SpO2: 99% (13 Jul 2025 16:10) (96% - 99%)    Parameters below as of 13 Jul 2025 16:10  Patient On (Oxygen Delivery Method): room air      ___________________________________    Physical Exam:     The patient was alert and in no apparent distress.  Conjunctivae were not injected and lids without edema or ectropion.  Oral mucosa and gums showed no ulcerations; vermillion lips intact  There was no visible lymphadenopathy.  Neck without gross enlargement of thyroid  The scalp, face, trunk and extremities were examined.  There was no hyperhidrosis or bromhidrosis.    Of note on skin exam:   - Many lightly scaly, confluent rough red papules/plaques on the anterior/posterior trunk, upper extremities and lower extremities bilaterally, also involving the groin; >65% BSA involvement  - Face clear  ____________________________________    LABS:                        14.4   10.33 )-----------( 308      ( 13 Jul 2025 14:57 )             43.0     07-13    138  |  102  |  20  ----------------------------<  109[H]  3.6   |  20[L]  |  0.96    Ca    8.9      13 Jul 2025 14:57    TPro  7.8  /  Alb  3.9  /  TBili  0.8  /  DBili  x   /  AST  57[H]  /  ALT  43  /  AlkPhos  81  07-13      Urinalysis Basic - ( 13 Jul 2025 14:57 )    Color: x / Appearance: x / SG: x / pH: x  Gluc: 109 mg/dL / Ketone: x  / Bili: x / Urobili: x   Blood: x / Protein: x / Nitrite: x   Leuk Esterase: x / RBC: x / WBC x   Sq Epi: x / Non Sq Epi: x / Bacteria: x   HPI: 69yo F with extensive inflammed rash over her entire trunk, groin and arms. No new medications, but was told her liver enzymes were elevated in the past. Will need labwork to evaluate for possible DIHS. No mucous involvement to suggest SJS/TENS. Sweet syndrome? Also endorses some new chills. Will consult dermatology; likely to require admission given the BSA involved.      Dermatology HPI: 70y F with PMH of Eczema presents with several day history of acutely worsening rash.  Patient has been continuously following with dermatology service in the outpatient setting for management of moderate to severe eczema and most recently saw Dr. Garza on 6/18 where patient was instructed to continue/restart Dupixent, and Dr. Horowitz on 7/9 where patient to hold Dupixent/topicals and attempt treatment of suspected tinea corporis with topical ketoconazole + PO terbinafine (had only started topicals).  Patient noted continuous worsening of rash which now encompasses the neck, entire trunk, upper extremities and lower extremities and is very itchy.  Patient presented to ED for further management given level of BSA involvement.      ACTIVE ISSUES:    PMH/PSH:  Hypertension    Hypothyroidism    Dyslipidemia    Psoriasis    Stress fracture  Left foot    Osteopenia    History of osteoarthritis    Psoriasis    Varicose vein of leg  removal of varicose vein LLE 2012    Status post bunionectomy  left    S/P foot surgery, right        REVIEW OF SYSTEMS  General: denies subjective fever/chills  Skin/Breast: see HPI  Ophthalmologic: no eye pain or change in vision  Respiratory and Thorax: denies shortness of breath  Cardiovascular: denies chest pain  Gastrointestinal: denies nausea/vomiting, constipation, diarrhea  Genitourinary: denies dysuria or hematuria  Musculoskeletal: denies joint pains or weakness	  Neurological: denies weakness or tingling  Psychiatric: denies mood changes    A 10-system ROS was performed and is negative except for those items noted above and/or in the HPI.    MEDICATIONS (HOME):  Home Medications:  Probiotic Formula (Bacillus Coagulans) oral capsule: 1 cap(s) orally once a day (11 May 2025 05:03)  Synthroid 125 mcg (0.125 mg) oral tablet: 1 tab(s) orally once a day (11 May 2025 05:03)  Vitamin D3 25 mcg/10 mL (1000 intl units/10 mL) oral liquid: 10 microgram(s) orally once a day (11 May 2025 05:03)    MEDICATIONS  (STANDING):    MEDICATIONS  (PRN):      Allergies    peanuts (Other)  No Known Drug Allergies    Intolerances    Actonel (Muscle Pain)      SOCIAL HISTORY:  Social History:  , lives with     FAMILY HISTORY:  No pertinent family history in first degree relatives        Vital Signs Last 24 Hrs  T(C): 36.6 (13 Jul 2025 16:10), Max: 36.8 (13 Jul 2025 13:54)  T(F): 97.9 (13 Jul 2025 16:10), Max: 98.3 (13 Jul 2025 13:54)  HR: 96 (13 Jul 2025 16:10) (96 - 99)  BP: 175/72 (13 Jul 2025 16:10) (168/78 - 175/72)  BP(mean): --  RR: 17 (13 Jul 2025 16:10) (17 - 18)  SpO2: 99% (13 Jul 2025 16:10) (96% - 99%)    Parameters below as of 13 Jul 2025 16:10  Patient On (Oxygen Delivery Method): room air      ___________________________________    Physical Exam:     The patient was alert and in no apparent distress.  Conjunctivae were not injected and lids without edema or ectropion.  Oral mucosa and gums showed no ulcerations; vermillion lips intact  There was no visible lymphadenopathy.  Neck without gross enlargement of thyroid  The scalp, face, trunk and extremities were examined.  There was no hyperhidrosis or bromhidrosis.    Of note on skin exam:   - Many lightly scaly, confluent rough red papules/plaques on the anterior/posterior trunk, upper extremities and lower extremities bilaterally, also involving the groin; >50% BSA involvement  - Face clear  ____________________________________    LABS:                        14.4   10.33 )-----------( 308      ( 13 Jul 2025 14:57 )             43.0     07-13    138  |  102  |  20  ----------------------------<  109[H]  3.6   |  20[L]  |  0.96    Ca    8.9      13 Jul 2025 14:57    TPro  7.8  /  Alb  3.9  /  TBili  0.8  /  DBili  x   /  AST  57[H]  /  ALT  43  /  AlkPhos  81  07-13      Urinalysis Basic - ( 13 Jul 2025 14:57 )    Color: x / Appearance: x / SG: x / pH: x  Gluc: 109 mg/dL / Ketone: x  / Bili: x / Urobili: x   Blood: x / Protein: x / Nitrite: x   Leuk Esterase: x / RBC: x / WBC x   Sq Epi: x / Non Sq Epi: x / Bacteria: x

## 2025-07-13 NOTE — ED ADULT TRIAGE NOTE - CHIEF COMPLAINT QUOTE
Rash x 3 months, worsening, large areas of itchy/painful redness.   has seen multiple doctors and has been treated with Dupixent, steroids, and most recently started antifungals 3 days ago. feels like it is worse since starting antifungal meds.

## 2025-07-13 NOTE — ED ADULT NURSE NOTE - OBJECTIVE STATEMENT
70y Axox4 F arrived from home complaining of rash for 3 months.. Pt reports extensive inflamed rash over entire trunk, bilateral arms and groins. Pt reports rash getting increasingly reddened and itchy. Pt ambulates independently. Family at bedside.

## 2025-07-13 NOTE — ED PROVIDER NOTE - PHYSICAL EXAMINATION
PHYSICAL EXAM:  GENERAL: NAD, well-developed  HEAD:  Atraumatic, Normocephalic  EYES: EOMI, PERRL  NECK: Supple, No JVD  CHEST/LUNG: Clear to auscultation bilaterally; No wheeze  HEART: Regular rate and rhythm; No murmurs, rubs, or gallops  ABDOMEN: Soft, Nontender, Nondistended; Bowel sounds present  EXTREMITIES:  2+ Peripheral Pulses, No clubbing, cyanosis, or edema  PSYCH: AAOx3  SKIN: Red patches over groin; upper legs, trunk, arms and up to the collar. Appears psoriatic in skin breakdown and inflamed. No involvement of mucous membranes.

## 2025-07-13 NOTE — ED PROVIDER NOTE - NSFOLLOWUPINSTRUCTIONS_ED_ALL_ED_FT
You were seen in the emergency department for a rash.  You were seen also by dermatology who gave us her recommendations.  They want you to take the clobetasol twice daily for 2 weeks on 1 week off that means use it for 2 weeks and then take a 1 week break to be used on the body except for face axilla and groin.  They would also like you to take the hydrocortisone cream twice daily this for your face axilla and groin use it for 2 weeks and then take 1 week break.  Dermatology wants to see you this week they stated they would not attempt to get you in emergently they will contact you with date and time.

## 2025-07-13 NOTE — ED ADULT NURSE NOTE - NS ED NURSE RECORD ANOTHER HT AND WT
Pa to bedside  
Pt ambulated to bathroom in attempt to give urine sample.   
US notified   
Waiting for provider to review results   
Yes

## 2025-07-13 NOTE — CONSULT NOTE ADULT - ASSESSMENT
#Atopic dermatitis, severe exacerbation of chronic disease, >65% BSA, itch out of proportion to rash  - Patient with history of prior hospital admission for erythroderma, TCR wnl, outside bx from 5/10 without evidence of MF, read as chronic spongiotic dermatitis (5/10/25 showed: parakeratosis, slight spongiosis, psoriasiform epidermal hyperplasia and a superficial perivascular inflammatory cell infiltrate with lymphocytes, histiocytes, and eosinophils)  - Noted an eczematous-appearing rash after 3rd injection of dupilumab; currently s/p 5th injection of dupilumab, next is due Wednesday, 7/16    PLAN:   - START Clobetasol 0.05% ointment twice daily to affected areas on body, NOT FOR FACE, AXILLA OR GROIN FOLDS for up to 2 weeks on, 1 week off  - START Hydrocortisone 2.5% ointment twice daily to affected areas on face, axilla or groin folds for up to 2 weeks on, 1 week off  -- Extensively counseled patient regarding wet wrap method, where patient may take a warm, not hot shower, and after coming out apply clobetasol ointment to all affected areas of rash, then cover with moist 100% cotton pajamas followed by a top layer of dry 100% cotton pajamas  - Dermatology to arrange for expedited outpatient follow-up appointment this week for further management of patient's atopic dermatitis flare; discussed how this may include changing biologic medication, or obtaining additional biopsy.  Patient and spouse at bedside in agreement with plan      Patient was seen at bedside and staffed in person with the dermatology attending Dr. Neri Heredia  Recommendations were communicated with the primary team.  Please page [phone] for further related questions.    Arthur Diaz MD  Resident Physician, PGY3  [facility]  Pager: [phone]  Office: [phone] #Atopic dermatitis, severe exacerbation of chronic disease, >65% BSA, itch out of proportion to rash  - Patient with history of prior hospital admission for erythroderma, TCR wnl, outside bx from 5/10 without evidence of MF, read as chronic spongiotic dermatitis (5/10/25 showed: parakeratosis, slight spongiosis, psoriasiform epidermal hyperplasia and a superficial perivascular inflammatory cell infiltrate with lymphocytes, histiocytes, and eosinophils)  - Noted an eczematous-appearing rash after 3rd injection of dupilumab; currently s/p 5th injection of dupilumab, next is due Wednesday, 7/16    PLAN:   - START Clobetasol 0.05% ointment twice daily to affected areas on body, NOT FOR FACE, AXILLA OR GROIN FOLDS for up to 2 weeks on, 1 week off  - START Hydrocortisone 2.5% ointment twice daily to affected areas on face, axilla or groin folds for up to 2 weeks on, 1 week off  -- Extensively counseled patient regarding wet wrap method, where patient may take a warm, not hot shower, and after coming out apply clobetasol ointment to all affected areas of rash, then cover with moist 100% cotton pajamas followed by a top layer of dry 100% cotton pajamas  - Dermatology to arrange expedited outpatient follow-up appointment this week for further management of patient's atopic dermatitis flare; discussed how this may include changing biologic medication, or obtaining additional biopsy.  Patient and spouse at bedside in agreement with plan      Patient was seen at bedside and staffed in person with the dermatology attending Dr. Neri Heredia  Recommendations were communicated with the primary team.  Thank you for allowing us to participate in the care of this patient. Dermatology to sign off at this time. Please notify us and re-consult as needed for any new or concerning changes to skin exam.  Patient to follow up at [facility], [address], [address], [place] [address] - [phone]  Please page [phone] for further related questions.    Arthur Diaz MD  Resident Physician, PGY3  [facility]  Pager: [phone]  Office: [phone] #Atopic dermatitis, severe exacerbation of chronic disease, >65% BSA, itch out of proportion to rash  - Patient with history of prior hospital admission for erythroderma, TCR wnl, outside bx from 5/10 without evidence of MF, read as chronic spongiotic dermatitis (5/10/25 showed: parakeratosis, slight spongiosis, psoriasiform epidermal hyperplasia and a superficial perivascular inflammatory cell infiltrate with lymphocytes, histiocytes, and eosinophils)  - Noted an eczematous-appearing rash after 3rd injection of dupilumab; currently s/p 5th injection of dupilumab, next is due Wednesday, 7/16    PLAN:   - START Clobetasol 0.05% ointment twice daily to affected areas on body, NOT FOR FACE, AXILLA OR GROIN FOLDS for up to 2 weeks on, 1 week off  - START Hydrocortisone 2.5% ointment twice daily to affected areas on face, axilla or groin folds for up to 2 weeks on, 1 week off  -- Extensively counseled patient regarding wet wrap method, where patient may take a warm, not hot shower, and after coming out apply clobetasol ointment to all affected areas of rash, then cover with moist 100% cotton pajamas followed by a top layer of dry 100% cotton pajamas  - gentle skin care, consider patch testing  - Dermatology to arrange expedited outpatient follow-up appointment this week for further management of patient's atopic dermatitis flare; discussed how this may include changing biologic medication, or obtaining additional biopsy.  Patient and spouse at bedside in agreement with plan      Patient was seen at bedside and staffed in person with the dermatology attending Dr. Neri Heredia  Recommendations were communicated with the primary team.  Thank you for allowing us to participate in the care of this patient. Dermatology to sign off at this time. Please notify us and re-consult as needed for any new or concerning changes to skin exam.  Patient to follow up at [facility], [address], [address], [place], [place] [address] - [phone]  Please page [phone] for further related questions.    Arthur Diaz MD  Resident Physician, PGY3  [facility]  Pager: [phone]  Office: [phone]

## 2025-07-13 NOTE — CONSULT NOTE ADULT - ATTENDING COMMENTS
Patient seen and evaluated at bedside today. Agree with above plan. Patient to follow up outpatient for continued management of chronic condition.

## 2025-07-13 NOTE — ED PROVIDER NOTE - CLINICAL SUMMARY MEDICAL DECISION MAKING FREE TEXT BOX
71yo F with extensive inflammed rash over her entire trunk, groin and arms. No new medications, but was told her liver enzymes were elevated in the past. Will need labwork to evaluate for possible DIHS. No mucous involvement to suggest SJS/TENS. Sweet syndrome? Also endorses some new chills. Will consult dermatology; likely to require admission given the BSA involved.

## 2025-07-13 NOTE — ED PROVIDER NOTE - PATIENT PORTAL LINK FT
You can access the FollowMyHealth Patient Portal offered by [facility] by registering at the following website: [email or web address]. By joining [employer]s FollowMyHealth portal, you will also be able to view your health information using other applications (apps) compatible with our system.

## 2025-07-13 NOTE — ED ADULT NURSE NOTE - NSFALLUNIVINTERV_ED_ALL_ED
Bed/Stretcher in lowest position, wheels locked, appropriate side rails in place/Call bell, personal items and telephone in reach/Instruct patient to call for assistance before getting out of bed/chair/stretcher/Non-slip footwear applied when patient is off stretcher/Gunlock to call system/Physically safe environment - no spills, clutter or unnecessary equipment/Purposeful proactive rounding/Room/bathroom lighting operational, light cord in reach

## 2025-07-16 ENCOUNTER — APPOINTMENT (OUTPATIENT)
Dept: DERMATOLOGY | Facility: CLINIC | Age: 70
End: 2025-07-16
Payer: MEDICARE

## 2025-07-16 ENCOUNTER — LABORATORY RESULT (OUTPATIENT)
Age: 70
End: 2025-07-16

## 2025-07-16 PROBLEM — D48.5 NEOPLASM OF UNCERTAIN BEHAVIOR OF SKIN: Status: ACTIVE | Noted: 2025-07-16

## 2025-07-16 PROCEDURE — 11102 TANGNTL BX SKIN SINGLE LES: CPT

## 2025-07-16 PROCEDURE — 99214 OFFICE O/P EST MOD 30 MIN: CPT | Mod: 25

## 2025-07-16 RX ORDER — CLOBETASOL PROPIONATE 0.5 MG/G
0.05 OINTMENT TOPICAL
Qty: 2 | Refills: 6 | Status: ACTIVE | COMMUNITY
Start: 2025-07-16 | End: 1900-01-01

## 2025-07-16 RX ORDER — CLOBETASOL PROPIONATE 0.5 MG/ML
0.05 SOLUTION TOPICAL
Qty: 1 | Refills: 11 | Status: ACTIVE | COMMUNITY
Start: 2025-07-16 | End: 1900-01-01

## 2025-07-18 PROBLEM — L29.9 PRURITUS: Status: ACTIVE | Noted: 2025-06-11

## 2025-07-23 ENCOUNTER — APPOINTMENT (OUTPATIENT)
Dept: DERMATOLOGY | Facility: CLINIC | Age: 70
End: 2025-07-23

## 2025-07-25 LAB
HIV1+2 AB SPEC QL IA.RAPID: NONREACTIVE
M TB IFN-G BLD-IMP: ABNORMAL
QUANTIFERON TB PLUS MITOGEN MINUS NIL: 0.4 IU/ML
QUANTIFERON TB PLUS NIL: 0.02 IU/ML
QUANTIFERON TB PLUS TB1 MINUS NIL: 0 IU/ML
QUANTIFERON TB PLUS TB2 MINUS NIL: 0 IU/ML

## 2025-07-30 RX ORDER — UPADACITINIB 15 MG/1
15 TABLET, EXTENDED RELEASE ORAL
Qty: 1 | Refills: 3 | Status: ACTIVE | COMMUNITY
Start: 2025-07-29 | End: 1900-01-01

## 2025-07-31 ENCOUNTER — OUTPATIENT (OUTPATIENT)
Dept: OUTPATIENT SERVICES | Facility: HOSPITAL | Age: 70
LOS: 1 days | End: 2025-07-31

## 2025-07-31 ENCOUNTER — APPOINTMENT (OUTPATIENT)
Dept: INTERNAL MEDICINE | Facility: CLINIC | Age: 70
End: 2025-07-31

## 2025-07-31 DIAGNOSIS — I83.93 ASYMPTOMATIC VARICOSE VEINS OF BILATERAL LOWER EXTREMITIES: Chronic | ICD-10-CM

## 2025-07-31 DIAGNOSIS — Z98.890 OTHER SPECIFIED POSTPROCEDURAL STATES: Chronic | ICD-10-CM

## 2025-07-31 DIAGNOSIS — Z98.89 OTHER SPECIFIED POSTPROCEDURAL STATES: Chronic | ICD-10-CM

## 2025-08-05 ENCOUNTER — NON-APPOINTMENT (OUTPATIENT)
Age: 70
End: 2025-08-05

## 2025-08-05 LAB
M TB IFN-G BLD-IMP: NEGATIVE
QUANTIFERON TB PLUS MITOGEN MINUS NIL: 3.04 IU/ML
QUANTIFERON TB PLUS NIL: 0.03 IU/ML
QUANTIFERON TB PLUS TB1 MINUS NIL: 0 IU/ML
QUANTIFERON TB PLUS TB2 MINUS NIL: 0 IU/ML

## 2025-08-13 ENCOUNTER — APPOINTMENT (OUTPATIENT)
Dept: DERMATOLOGY | Facility: CLINIC | Age: 70
End: 2025-08-13
Payer: MEDICARE

## 2025-08-13 ENCOUNTER — LABORATORY RESULT (OUTPATIENT)
Age: 70
End: 2025-08-13

## 2025-08-13 DIAGNOSIS — Z79.899 OTHER LONG TERM (CURRENT) DRUG THERAPY: ICD-10-CM

## 2025-08-13 DIAGNOSIS — L20.9 ATOPIC DERMATITIS, UNSPECIFIED: ICD-10-CM

## 2025-08-13 PROCEDURE — 99214 OFFICE O/P EST MOD 30 MIN: CPT

## 2025-09-26 LAB
ALBUMIN SERPL ELPH-MCNC: 4.5 G/DL
ALP BLD-CCNC: 67 U/L
ALT SERPL-CCNC: 258 U/L
ANION GAP SERPL CALC-SCNC: 18 MMOL/L
AST SERPL-CCNC: 332 U/L
BILIRUB SERPL-MCNC: 0.6 MG/DL
BUN SERPL-MCNC: 10 MG/DL
CALCIUM SERPL-MCNC: 9.2 MG/DL
CHLORIDE SERPL-SCNC: 103 MMOL/L
CO2 SERPL-SCNC: 17 MMOL/L
CREAT SERPL-MCNC: 0.8 MG/DL
EGFRCR SERPLBLD CKD-EPI 2021: 79 ML/MIN/1.73M2
GLUCOSE SERPL-MCNC: 88 MG/DL
POTASSIUM SERPL-SCNC: 4.7 MMOL/L
PROT SERPL-MCNC: 8.1 G/DL
SODIUM SERPL-SCNC: 138 MMOL/L

## (undated) DEVICE — PACK MINOR

## (undated) DEVICE — DRAPE 1/2 SHEET 40X57"

## (undated) DEVICE — DRILL BIT STRYKER ORTHO 2.7MM

## (undated) DEVICE — DRSG CAST PLASTER 4" (BLUE)

## (undated) DEVICE — BLADE SCALPEL SAFETYLOCK #10

## (undated) DEVICE — DRSG STOCKINETTE TUBULAR COTTON 1PLY 6X72"

## (undated) DEVICE — SUT POLYSORB 2-0 30" V-20 UNDYED

## (undated) DEVICE — SAW BLADE STRYKER MED NARROW 18X5.5MM

## (undated) DEVICE — BLADE SCALPEL SAFETYLOCK #15

## (undated) DEVICE — WRIGHT MEDICAL PREPARATION KIT

## (undated) DEVICE — DRSG CURITY GAUZE SPONGE 4 X 4" 12-PLY

## (undated) DEVICE — SUT MONOSOF 4-0 18" C-13

## (undated) DEVICE — SUT POLYSORB 3-0 18" TIES UNDYED

## (undated) DEVICE — VESSEL LOOP MAXI-RED  0.120" X 16"

## (undated) DEVICE — SHOE  POSTOP SOFTIE DARCO M-LG

## (undated) DEVICE — DRSG WEBRIL 4"

## (undated) DEVICE — POSITIONER FOAM EGG CRATE ULNAR 2PCS (PINK)

## (undated) DEVICE — DRILL BIT WRIGHT MEDICAL 2.5MM

## (undated) DEVICE — SUT SURGIPRO II 4-0 18" P-12

## (undated) DEVICE — CATH IV SAFE INSYTE 16G X 1.16" (GRAY)

## (undated) DEVICE — SOL IRR POUR H2O 250ML

## (undated) DEVICE — DRSG KLING 4"

## (undated) DEVICE — DRSG KLING 6"

## (undated) DEVICE — DRAPE SPLIT SHEET 77" X 108"

## (undated) DEVICE — DRSG KLING 3"

## (undated) DEVICE — DRSG CAST PLASTER 3" (BLUE)

## (undated) DEVICE — DRSG ADAPTIC 3 X 3"

## (undated) DEVICE — TOURNIQUET CUFF 18" DUAL PORT DUAL BLADDER W PLC (BLACK)

## (undated) DEVICE — YELLOW PIN COVER

## (undated) DEVICE — VENODYNE/SCD SLEEVE CALF MEDIUM

## (undated) DEVICE — SYR LUER LOK 50CC

## (undated) DEVICE — SUT POLYSORB 3-0 18" P-12 UNDYED

## (undated) DEVICE — DRAPE MAYO STAND 23"

## (undated) DEVICE — SHOE  POSTOP SOFTIE DARCO M-SM

## (undated) DEVICE — NDL HYPO SAFE 18G X 1.5" (PINK)

## (undated) DEVICE — DRSG ACE BANDAGE 4"

## (undated) DEVICE — SYR LUER LOK 20CC

## (undated) DEVICE — DRAPE INSTRUMENT POUCH 6.75" X 11"

## (undated) DEVICE — SHOE  POSTOP SOFTIE DARCO M-M

## (undated) DEVICE — COUNTERSINK STRYKER FOR 4MM W AO COUPLING

## (undated) DEVICE — SPECIMEN CONTAINER 100ML

## (undated) DEVICE — SOL IRR POUR NS 0.9% 500ML

## (undated) DEVICE — DRILL BIT WRIGHT MEDICAL 2.0MM

## (undated) DEVICE — SUT MONOCRYL 3-0 27" PS-2 UNDYED

## (undated) DEVICE — DRAPE TOWEL BLUE 17" X 24"

## (undated) DEVICE — DRAPE EXTREMITY 87" X 128.5"

## (undated) DEVICE — GLV 7 PROTEXIS (WHITE)

## (undated) DEVICE — SUT POLYSORB 3-0 30" P-12 UNDYED

## (undated) DEVICE — BUR STRYKER EGG MED 4MM

## (undated) DEVICE — NDL HYPO REGULAR BEVEL 25G X 1.5" (BLUE)

## (undated) DEVICE — SAW BLADE STRYKER MED AGGRESSIVE 9X25X0.38MM

## (undated) DEVICE — DRSG ACE BANDAGE 3"

## (undated) DEVICE — DRAPE C ARM MINI MOBILE XPAY OEC 6600

## (undated) DEVICE — WARMING BLANKET UPPER ADULT

## (undated) DEVICE — BUR STRYKER EGG 4MM

## (undated) DEVICE — GLV 6.5 PROTEXIS (WHITE)